# Patient Record
Sex: MALE | Race: WHITE | NOT HISPANIC OR LATINO | Employment: OTHER | URBAN - METROPOLITAN AREA
[De-identification: names, ages, dates, MRNs, and addresses within clinical notes are randomized per-mention and may not be internally consistent; named-entity substitution may affect disease eponyms.]

---

## 2023-05-31 ENCOUNTER — OFFICE VISIT (OUTPATIENT)
Dept: PRIMARY CARE | Facility: CLINIC | Age: 53
End: 2023-05-31
Payer: COMMERCIAL

## 2023-05-31 ENCOUNTER — APPOINTMENT (OUTPATIENT)
Dept: PRIMARY CARE | Facility: CLINIC | Age: 53
End: 2023-05-31
Payer: COMMERCIAL

## 2023-05-31 VITALS
OXYGEN SATURATION: 98 % | SYSTOLIC BLOOD PRESSURE: 110 MMHG | RESPIRATION RATE: 16 BRPM | WEIGHT: 186 LBS | HEART RATE: 87 BPM | BODY MASS INDEX: 24.54 KG/M2 | DIASTOLIC BLOOD PRESSURE: 60 MMHG | TEMPERATURE: 97.4 F

## 2023-05-31 DIAGNOSIS — E78.5 HYPERLIPIDEMIA, UNSPECIFIED HYPERLIPIDEMIA TYPE: ICD-10-CM

## 2023-05-31 DIAGNOSIS — I21.4 NSTEMI (NON-ST ELEVATED MYOCARDIAL INFARCTION) (MULTI): ICD-10-CM

## 2023-05-31 DIAGNOSIS — F43.21 GRIEF: ICD-10-CM

## 2023-05-31 DIAGNOSIS — I25.41 CORONARY ARTERY ANEURYSM: Primary | ICD-10-CM

## 2023-05-31 DIAGNOSIS — Z72.0 TOBACCO ABUSE: ICD-10-CM

## 2023-05-31 PROBLEM — J45.909 ASTHMA (HHS-HCC): Status: ACTIVE | Noted: 2023-05-31

## 2023-05-31 PROCEDURE — 99214 OFFICE O/P EST MOD 30 MIN: CPT | Performed by: FAMILY MEDICINE

## 2023-05-31 RX ORDER — VARENICLINE TARTRATE 0.5 (11)-1
0.5 KIT ORAL 2 TIMES DAILY
Qty: 53 EACH | Refills: 0 | Status: SHIPPED | OUTPATIENT
Start: 2023-05-31 | End: 2023-07-06

## 2023-05-31 RX ORDER — VARENICLINE TARTRATE 1 MG/1
1 TABLET, FILM COATED ORAL 2 TIMES DAILY
Qty: 60 TABLET | Refills: 2 | Status: SHIPPED | OUTPATIENT
Start: 2023-05-31 | End: 2023-07-06 | Stop reason: SDUPTHER

## 2023-05-31 RX ORDER — ATORVASTATIN CALCIUM 40 MG/1
40 TABLET, FILM COATED ORAL DAILY
COMMUNITY
End: 2023-12-04 | Stop reason: SDUPTHER

## 2023-05-31 RX ORDER — CITALOPRAM 20 MG/1
20 TABLET, FILM COATED ORAL DAILY
Qty: 30 TABLET | Refills: 3 | Status: SHIPPED | OUTPATIENT
Start: 2023-05-31 | End: 2023-10-20 | Stop reason: ALTCHOICE

## 2023-05-31 RX ORDER — ASPIRIN 81 MG/1
1 TABLET ORAL DAILY
COMMUNITY
Start: 2020-03-23

## 2023-05-31 RX ORDER — METOPROLOL SUCCINATE 25 MG/1
25 TABLET, EXTENDED RELEASE ORAL DAILY
COMMUNITY
End: 2023-05-31 | Stop reason: SINTOL

## 2023-05-31 RX ORDER — ALBUTEROL SULFATE 90 UG/1
AEROSOL, METERED RESPIRATORY (INHALATION)
COMMUNITY
End: 2023-12-04 | Stop reason: SDUPTHER

## 2023-05-31 NOTE — PROGRESS NOTES
Subjective   Patient ID: Festus Negrete Jr. is a 53 y.o. male who presents for No chief complaint on file..    His dad recently passed away suddenly they think from an MI  He stopped his metoprolol because he doesn't like how it made him feel    He has been smoking and drinking more the past 2 weeks.  HE would like to cut back. Chantix worked for him in the past.     He tried Wellbutrin for depression in the past but he had bad dreams and doesn't want to try that.      He hasn't had his cholesterol checked in quite sometime and is overdue for this.           Review of Systems    Objective   /60   Pulse 87   Temp 36.3 °C (97.4 °F)   Resp 16   Wt 84.4 kg (186 lb)   SpO2 98%   BMI 24.54 kg/m²     Physical Exam  Constitutional:       Appearance: Normal appearance.   HENT:      Head: Normocephalic and atraumatic.   Eyes:      Pupils: Pupils are equal, round, and reactive to light.   Cardiovascular:      Rate and Rhythm: Normal rate and regular rhythm.      Pulses: Normal pulses.      Heart sounds: No murmur heard.  Pulmonary:      Effort: Pulmonary effort is normal.      Breath sounds: Normal breath sounds.   Musculoskeletal:         General: No swelling.      Cervical back: Normal range of motion and neck supple.   Neurological:      Mental Status: He is alert.         Assessment/Plan   Diagnoses and all orders for this visit:  Coronary artery aneurysm  Comments:  Dr. He  NSTEMI (non-ST elevated myocardial infarction) (CMS/[facility])  Hyperlipidemia, unspecified hyperlipidemia type  -     Lipid Panel; Future  -     Comprehensive Metabolic Panel; Future  Tobacco abuse  -     varenicline (Chantix Starting Month Box) 0.5 mg (11)- 1 mg (42) tablet; Take 0.5 mg by mouth 2 times a day.  -     varenicline (Chantix) 1 mg tablet; Take 1 tablet (1 mg) by mouth 2 times a day. Take with full glass of water. Begin after finishing the starter pack  Grief  -     citalopram (CeleXA) 20 mg tablet; Take 1 tablet (20 mg) by mouth  once daily. 1/2 pill week 1 and 2 and then increase to a whole pill

## 2023-05-31 NOTE — PATIENT INSTRUCTIONS
Today we have followed up on several issues    For your smoking I have recommended that you try chantix again.      For your recent loss of your dad/grief and depression I have advised trying celexa this time.  Start with 1/2 tablet daily and then increase to a full tablet daily.     For your beta blocker side effect I will reach out to your cardiologist to see what he recommends instead.      Follow up when results received for your cholesterol     Follow up in 6 to 8 weeks for your celexa recheck.

## 2023-06-27 DIAGNOSIS — F43.21 GRIEF: ICD-10-CM

## 2023-06-27 RX ORDER — CITALOPRAM 20 MG/1
20 TABLET, FILM COATED ORAL DAILY
Qty: 30 TABLET | Refills: 3 | OUTPATIENT
Start: 2023-06-27 | End: 2023-08-26

## 2023-06-29 ENCOUNTER — PATIENT OUTREACH (OUTPATIENT)
Dept: PRIMARY CARE | Facility: CLINIC | Age: 53
End: 2023-06-29
Payer: COMMERCIAL

## 2023-06-29 RX ORDER — METRONIDAZOLE 500 MG/1
500 TABLET ORAL 3 TIMES DAILY
COMMUNITY
End: 2023-07-07 | Stop reason: ENTERED-IN-ERROR

## 2023-06-29 RX ORDER — CIPROFLOXACIN 500 MG/1
500 TABLET ORAL 2 TIMES DAILY
COMMUNITY
End: 2023-12-04 | Stop reason: WASHOUT

## 2023-06-29 RX ORDER — IBUPROFEN 200 MG
1 TABLET ORAL EVERY 24 HOURS
COMMUNITY
End: 2023-07-07

## 2023-06-29 NOTE — PROGRESS NOTES
Discharge Facility: [facility]  Discharge Diagnosis:Diverticulitis of colon with perforation Periumbilical abdominal pain Benign essential hypertension   Admission Date: 6/25/2023  Discharge Date: 6/28/2023    PCP Appointment Date:7/11/2023  Specialist Appointment Date: NONE  Hospital Encounter and Summary:not available at this time   See discharge assessment below for further details    EFRA MURRAY, General Surgery Within 7 to 10 days [address], [address] [place] [address] [id number] Business     Engagement  Call Start Time: 1041 (6/29/2023 10:41 AM)    Medications  Medications reviewed with patient/caregiver?: Yes (6/29/2023 10:41 AM)  Is the patient having any side effects they believe may be caused by any medication additions or changes?: No (6/29/2023 10:41 AM)  Does the patient have all medications ordered at discharge?: Yes (NEW MEDS: CIPRO, FLAGYL, NICOTINE PATCH) (6/29/2023 10:41 AM)  Is the patient taking all medications as directed (includes completed medication regime)?: Yes (6/29/2023 10:41 AM)  Medication Comments: SEE MED LIST (6/29/2023 10:41 AM)    Appointments  Does the patient have a primary care provider?: Yes (6/29/2023 10:41 AM)  Care Management Interventions: Verified appointment date/time/provider (6/29/2023 10:41 AM)  Has the patient kept scheduled appointments due by today?: Yes (6/29/2023 10:41 AM)  Care Management Interventions: Advised patient to keep appointment (6/29/2023 10:41 AM)    Self Management  What is the home health agency?: NONE (6/29/2023 10:41 AM)  Has home health visited the patient within 72 hours of discharge?: Not applicable (6/29/2023 10:41 AM)    Patient Teaching  Does the patient have access to their discharge instructions?: Yes (6/29/2023 10:41 AM)  Care Management Interventions: Reviewed instructions with patient (6/29/2023 10:41 AM)  What is the patient's perception of their health status since discharge?: Improving (6/29/2023 10:41 AM)  Is the  patient/caregiver able to teach back the hierarchy of who to call/visit for symptoms/problems? PCP, Specialist, Home Health nurse, Urgent Care, ED, 911: Yes (6/29/2023 10:41 AM)  If the patient is a current smoker, are they able to teach back resources for cessation?: Smoking cessation medications (6/29/2023 10:41 AM)  Patient/Caregiver Education Comments: NICOTINE PATCH PRESCRIBED AT DISCHARGE (6/29/2023 10:41 AM)    Wrap Up  Wrap Up Additional Comments: spoke with patient. he is doing well. no needs or concerns at this time. has all meds. has appt with pcp scheduled. denies pain (6/29/2023 10:41 AM)  Call End Time: 1044 (6/29/2023 10:41 AM)

## 2023-07-06 ENCOUNTER — TELEPHONE (OUTPATIENT)
Dept: PRIMARY CARE | Facility: CLINIC | Age: 53
End: 2023-07-06
Payer: COMMERCIAL

## 2023-07-06 DIAGNOSIS — Z72.0 TOBACCO ABUSE: Primary | ICD-10-CM

## 2023-07-06 DIAGNOSIS — Z72.0 TOBACCO ABUSE: ICD-10-CM

## 2023-07-06 RX ORDER — VARENICLINE TARTRATE 0.5 (11)-1
0.5 KIT ORAL 2 TIMES DAILY
Qty: 53 EACH | Refills: 0 | OUTPATIENT
Start: 2023-07-06 | End: 2023-10-04

## 2023-07-06 RX ORDER — VARENICLINE TARTRATE 1 MG/1
1 TABLET, FILM COATED ORAL 2 TIMES DAILY
Qty: 60 TABLET | Refills: 2 | Status: SHIPPED | OUTPATIENT
Start: 2023-07-06 | End: 2023-10-20 | Stop reason: ALTCHOICE

## 2023-07-07 ENCOUNTER — OFFICE VISIT (OUTPATIENT)
Dept: PRIMARY CARE | Facility: CLINIC | Age: 53
End: 2023-07-07
Payer: COMMERCIAL

## 2023-07-07 VITALS
OXYGEN SATURATION: 97 % | TEMPERATURE: 98.3 F | WEIGHT: 184 LBS | BODY MASS INDEX: 24.28 KG/M2 | RESPIRATION RATE: 18 BRPM | SYSTOLIC BLOOD PRESSURE: 116 MMHG | HEART RATE: 73 BPM | DIASTOLIC BLOOD PRESSURE: 68 MMHG

## 2023-07-07 DIAGNOSIS — K57.20 DIVERTICULITIS OF COLON WITH PERFORATION: Primary | ICD-10-CM

## 2023-07-07 PROCEDURE — 99495 TRANSJ CARE MGMT MOD F2F 14D: CPT | Performed by: FAMILY MEDICINE

## 2023-07-07 RX ORDER — METRONIDAZOLE 500 MG/1
500 TABLET ORAL 3 TIMES DAILY
COMMUNITY
Start: 2023-06-28 | End: 2023-07-08

## 2023-07-07 NOTE — PATIENT INSTRUCTIONS
Today we have followed up on your recent hospital admission for diverticulitis with perforation    Please follow up with the surgeon Dr. Kareem Deleon    Please follow up with GI I referred you today.      Follow up with me or go to the ER if no improvement or if symptoms worsen

## 2023-07-07 NOTE — PROGRESS NOTES
Subjective   Patient ID: Festus Negrete Jr. is a 53 y.o. male who presents for Hospital Follow-up ([facility] admission for diverticulitis).    2 sundays ago he started having pain in the center and wasn't having regular bm's.  He wasn't seeing any blood but he was just having some constipation. Then he got the fever and pain back and fever 102F.      They said it was perforated but they did labs and IV antibiotics.  He saw a surgeon.  He has to follow up with the surgeon.      He is not having fevers. He is not having a lot of thirst with his antibiotics.  He is still having liquid bowels.  He needs a GI doctor         Review of Systems    Objective   /68   Pulse 73   Temp 36.8 °C (98.3 °F)   Resp 18   Wt 83.5 kg (184 lb)   SpO2 97%   BMI 24.28 kg/m²     Physical Exam  Constitutional:       Appearance: Normal appearance.   HENT:      Head: Normocephalic.      Right Ear: Tympanic membrane normal.      Left Ear: Tympanic membrane normal.      Mouth/Throat:      Mouth: Mucous membranes are moist.   Cardiovascular:      Rate and Rhythm: Normal rate and regular rhythm.   Pulmonary:      Effort: Pulmonary effort is normal. No respiratory distress.   Abdominal:      General: There is no distension.      Palpations: Abdomen is soft. There is no mass.      Tenderness: There is no abdominal tenderness. There is no guarding.      Hernia: No hernia is present.      Comments: Increased bowel sounds   Musculoskeletal:      Cervical back: Neck supple.   Skin:     General: Skin is warm and dry.   Neurological:      General: No focal deficit present.      Mental Status: He is alert.   Psychiatric:         Mood and Affect: Mood normal.         Assessment/Plan   Diagnoses and all orders for this visit:  Diverticulitis of colon with perforation  -     Referral to Gastroenterology; Future

## 2023-07-11 ENCOUNTER — APPOINTMENT (OUTPATIENT)
Dept: PRIMARY CARE | Facility: CLINIC | Age: 53
End: 2023-07-11
Payer: COMMERCIAL

## 2023-07-14 ENCOUNTER — PATIENT OUTREACH (OUTPATIENT)
Dept: PRIMARY CARE | Facility: CLINIC | Age: 53
End: 2023-07-14
Payer: COMMERCIAL

## 2023-07-14 NOTE — PROGRESS NOTES
Call regarding appt. with PCP on 7/7/2023after hospitalization.  At time of outreach call the patient feels as if their condition has IMPROVED since last visit.  Reviewed the PCP appointment with the pt and addressed any questions or concerns.

## 2023-08-16 ENCOUNTER — PATIENT OUTREACH (OUTPATIENT)
Dept: PRIMARY CARE | Facility: CLINIC | Age: 53
End: 2023-08-16
Payer: COMMERCIAL

## 2023-09-10 PROBLEM — J34.2 DEVIATED NASAL SEPTUM: Status: ACTIVE | Noted: 2023-09-10

## 2023-09-10 PROBLEM — I42.2 ASYMMETRIC SEPTAL HYPERTROPHY (MULTI): Status: ACTIVE | Noted: 2023-09-10

## 2023-09-10 PROBLEM — I10 BENIGN ESSENTIAL HYPERTENSION: Status: ACTIVE | Noted: 2023-06-26

## 2023-09-10 PROBLEM — J34.3 HYPERTROPHY OF INFERIOR NASAL TURBINATE: Status: ACTIVE | Noted: 2023-09-10

## 2023-09-10 PROBLEM — I51.7 ASYMMETRIC SEPTAL HYPERTROPHY: Status: ACTIVE | Noted: 2023-09-10

## 2023-09-10 PROBLEM — R07.89 CHEST PAIN, ATYPICAL: Status: ACTIVE | Noted: 2023-09-10

## 2023-09-10 PROBLEM — K57.20 DIVERTICULITIS OF COLON WITH PERFORATION: Status: ACTIVE | Noted: 2023-06-25

## 2023-09-10 PROBLEM — R10.33 PERIUMBILICAL ABDOMINAL PAIN: Status: ACTIVE | Noted: 2023-06-26

## 2023-09-10 PROBLEM — S20.211A CONTUSION OF RIB ON RIGHT SIDE: Status: ACTIVE | Noted: 2023-09-10

## 2023-09-10 PROBLEM — M54.12 CERVICAL RADICULOPATHY, ACUTE: Status: ACTIVE | Noted: 2023-09-10

## 2023-09-10 PROBLEM — J45.40 MODERATE PERSISTENT ASTHMA (HHS-HCC): Status: ACTIVE | Noted: 2023-09-10

## 2023-09-10 PROBLEM — J34.89 NASAL DRYNESS: Status: ACTIVE | Noted: 2023-09-10

## 2023-09-10 PROBLEM — R06.02 SOB (SHORTNESS OF BREATH) ON EXERTION: Status: ACTIVE | Noted: 2023-09-10

## 2023-09-10 PROBLEM — S22.41XA MULTIPLE CLOSED FRACTURES OF RIBS OF RIGHT SIDE: Status: ACTIVE | Noted: 2023-09-10

## 2023-09-10 RX ORDER — FLUTICASONE PROPIONATE 50 MCG
2 SPRAY, SUSPENSION (ML) NASAL DAILY
COMMUNITY
Start: 2023-06-26

## 2023-09-10 RX ORDER — FLUTICASONE PROPIONATE AND SALMETEROL 250; 50 UG/1; UG/1
POWDER RESPIRATORY (INHALATION)
COMMUNITY
Start: 2023-06-26 | End: 2023-12-04 | Stop reason: SDUPTHER

## 2023-09-10 RX ORDER — IBUPROFEN 200 MG
21 TABLET ORAL
COMMUNITY
Start: 2023-06-28 | End: 2023-12-04 | Stop reason: ALTCHOICE

## 2023-09-10 RX ORDER — METOPROLOL SUCCINATE 25 MG/1
25 TABLET, EXTENDED RELEASE ORAL DAILY
COMMUNITY
End: 2023-12-04 | Stop reason: WASHOUT

## 2023-09-28 ENCOUNTER — PATIENT OUTREACH (OUTPATIENT)
Dept: PRIMARY CARE | Facility: CLINIC | Age: 53
End: 2023-09-28
Payer: COMMERCIAL

## 2023-10-13 ENCOUNTER — HOSPITAL ENCOUNTER (OUTPATIENT)
Dept: CARDIOLOGY | Facility: CLINIC | Age: 53
Discharge: HOME | End: 2023-10-13
Payer: COMMERCIAL

## 2023-10-13 ENCOUNTER — APPOINTMENT (OUTPATIENT)
Dept: CARDIOLOGY | Facility: CLINIC | Age: 53
End: 2023-10-13
Payer: COMMERCIAL

## 2023-10-13 DIAGNOSIS — I25.41 CORONARY ARTERY ANEURYSM: Primary | ICD-10-CM

## 2023-10-13 DIAGNOSIS — I25.10 ATHEROSCLEROTIC HEART DISEASE OF NATIVE CORONARY ARTERY WITHOUT ANGINA PECTORIS: ICD-10-CM

## 2023-10-13 DIAGNOSIS — R07.89 OTHER CHEST PAIN: ICD-10-CM

## 2023-10-13 PROCEDURE — 93306 TTE W/DOPPLER COMPLETE: CPT

## 2023-10-13 PROCEDURE — 2500000004 HC RX 250 GENERAL PHARMACY W/ HCPCS (ALT 636 FOR OP/ED): Performed by: INTERNAL MEDICINE

## 2023-10-13 PROCEDURE — 93306 TTE W/DOPPLER COMPLETE: CPT | Performed by: INTERNAL MEDICINE

## 2023-10-13 RX ADMIN — PERFLUTREN 0.5 ML: 6.52 INJECTION, SUSPENSION INTRAVENOUS at 09:13

## 2023-10-16 LAB — EJECTION FRACTION APICAL 4 CHAMBER: 63.1

## 2023-10-20 ENCOUNTER — OFFICE VISIT (OUTPATIENT)
Dept: CARDIOLOGY | Facility: CLINIC | Age: 53
End: 2023-10-20
Payer: COMMERCIAL

## 2023-10-20 VITALS
BODY MASS INDEX: 25.23 KG/M2 | HEART RATE: 66 BPM | WEIGHT: 190.4 LBS | SYSTOLIC BLOOD PRESSURE: 104 MMHG | HEIGHT: 73 IN | DIASTOLIC BLOOD PRESSURE: 64 MMHG | OXYGEN SATURATION: 97 %

## 2023-10-20 DIAGNOSIS — E78.2 MIXED HYPERLIPIDEMIA: ICD-10-CM

## 2023-10-20 DIAGNOSIS — I42.2 HYPERTROPHIC CARDIOMYOPATHY (MULTI): ICD-10-CM

## 2023-10-20 DIAGNOSIS — R06.02 SOB (SHORTNESS OF BREATH) ON EXERTION: ICD-10-CM

## 2023-10-20 DIAGNOSIS — I25.41 CORONARY ARTERY ANEURYSM: Primary | ICD-10-CM

## 2023-10-20 DIAGNOSIS — I34.0 MODERATE MITRAL REGURGITATION: ICD-10-CM

## 2023-10-20 DIAGNOSIS — I20.89 CHRONIC STABLE ANGINA (CMS-HCC): ICD-10-CM

## 2023-10-20 DIAGNOSIS — I10 BENIGN ESSENTIAL HYPERTENSION: ICD-10-CM

## 2023-10-20 PROCEDURE — 3074F SYST BP LT 130 MM HG: CPT | Performed by: INTERNAL MEDICINE

## 2023-10-20 PROCEDURE — 3078F DIAST BP <80 MM HG: CPT | Performed by: INTERNAL MEDICINE

## 2023-10-20 PROCEDURE — 99214 OFFICE O/P EST MOD 30 MIN: CPT | Performed by: INTERNAL MEDICINE

## 2023-10-20 RX ORDER — NITROGLYCERIN 0.4 MG/1
0.4 TABLET SUBLINGUAL EVERY 5 MIN PRN
Qty: 25 TABLET | Refills: 11 | Status: SHIPPED | OUTPATIENT
Start: 2023-10-20 | End: 2024-04-22

## 2023-10-20 NOTE — PATIENT INSTRUCTIONS
We have ordered a cardiac MRI to determine the extent of the muscle buildup in your heart.    Call us a few days after the test for the results.

## 2023-10-20 NOTE — PROGRESS NOTES
Subjective   Festus Negrete Jr. is a 53 y.o. male.    Chief Complaint:  Chest pain    HPI    He describes chest discomfort.  It is in the midsternal area.  It radiates to the right arm and right shoulder.  Usually exertionally related but it also can occur at rest.  Usually last for less than 1 minute.  With activities he will stop the activity and the chest discomfort disappears.  Occasionally he will take sublingual nitroglycerin.  He has a very vigorous job.  No syncopal events.    Cardiac catheterization in March 2020 at [facility] demonstrated a 5-6 mm aneurysm in the mid left anterior descending coronary artery. The remainder of his coronary arteries were normal. Specifically the right coronary artery was normal.    His echocardiographic study demonstrated asymmetric septal hypertrophy. There was no left ventricular outflow tract obstruction although systolic anterior motion of the mitral valve was noted.     Risk factors for coronary artery disease include smoking history and a strongly positive family history of heart disease in that his father had a myocardial infarction at the age of 50. There is no history of hypertension or diabetes. He does have hyperlipidemia.    He's otherwise been in good health with no major medical problems.    He is self-employed as a el contractor.     Social history: Longtime smoker.    Family history: Family history of coronary artery disease in his father and paternal grandfather.    Review of Systems   Cardiovascular:  Positive for chest pain.   All other systems reviewed and are negative.      Objective   Constitutional:       Appearance: Not in distress.   Neck:      Vascular: JVD normal.   Pulmonary:      Breath sounds: Normal breath sounds.   Cardiovascular:      Normal rate. Regular rhythm. S1 with normal intensity. S2 with normal intensity.       Murmurs: There is a grade 1/6 systolic murmur.      No gallop.    Pulses:     Intact distal pulses.   Edema:      Peripheral edema absent.   Abdominal:      General: Bowel sounds are normal.   Neurological:      Mental Status: Alert and oriented to person, place and time.       Assessment/Plan     1.  Hypertrophic cardiomyopathy.  Most of the hypertrophy is in the mid left ventricular septum.  There is no evidence of outflow tract obstruction.  No systolic anterior motion of the mitral valve.  However the degree of left ventricular perjury in this area is very significant.  We are going to do a cardiac MRI to help us risk stratify this patient.  In particular we are looking for fibrosis in this area.  He is to call us with the results of the cardiac MRI.    2.  Coronary artery disease.  Aneurysmal coronary artery disease.  Nonobstructive disease.  Not sure what to make of his symptoms.  These have been stable.  I do not think this is related to his left anterior descending coronary artery aneurysm.  He has no obstructive coronary disease based on a cardiac catheterization 3 years ago.  At some point the future we may do a stress imaging study.    3.  Smoking abuse.  Discussed options for stopping smoking.    4.  History of diverticulitis.  Hospitalized in June 2023 at [facility] for acute diverticulitis.  He had a microperforation.  No cardiac issues during his hospitalization.    5.  Moderate mitral regurgitation.  No symptoms of congestive heart failure.

## 2023-10-25 ENCOUNTER — APPOINTMENT (OUTPATIENT)
Dept: CARDIOLOGY | Facility: CLINIC | Age: 53
End: 2023-10-25
Payer: COMMERCIAL

## 2023-11-08 ENCOUNTER — HOSPITAL ENCOUNTER (OUTPATIENT)
Dept: RADIOLOGY | Facility: HOSPITAL | Age: 53
End: 2023-11-08

## 2023-11-30 ENCOUNTER — HOSPITAL ENCOUNTER (OUTPATIENT)
Dept: RADIOLOGY | Facility: HOSPITAL | Age: 53
Discharge: HOME | End: 2023-11-30
Payer: COMMERCIAL

## 2023-11-30 DIAGNOSIS — I42.2 HYPERTROPHIC CARDIOMYOPATHY (MULTI): ICD-10-CM

## 2023-11-30 PROCEDURE — 75565 CARD MRI VELOC FLOW MAPPING: CPT | Performed by: STUDENT IN AN ORGANIZED HEALTH CARE EDUCATION/TRAINING PROGRAM

## 2023-11-30 PROCEDURE — 75561 CARDIAC MRI FOR MORPH W/DYE: CPT

## 2023-11-30 PROCEDURE — 75561 CARDIAC MRI FOR MORPH W/DYE: CPT | Performed by: STUDENT IN AN ORGANIZED HEALTH CARE EDUCATION/TRAINING PROGRAM

## 2023-11-30 PROCEDURE — 2550000001 HC RX 255 CONTRASTS: Performed by: INTERNAL MEDICINE

## 2023-11-30 PROCEDURE — A9575 INJ GADOTERATE MEGLUMI 0.1ML: HCPCS | Performed by: INTERNAL MEDICINE

## 2023-11-30 RX ORDER — GADOTERATE MEGLUMINE 376.9 MG/ML
25 INJECTION INTRAVENOUS
Status: COMPLETED | OUTPATIENT
Start: 2023-11-30 | End: 2023-11-30

## 2023-11-30 RX ADMIN — GADOTERATE MEGLUMINE 25 ML: 376.9 INJECTION INTRAVENOUS at 08:21

## 2023-12-04 ENCOUNTER — OFFICE VISIT (OUTPATIENT)
Dept: PRIMARY CARE | Facility: CLINIC | Age: 53
End: 2023-12-04
Payer: COMMERCIAL

## 2023-12-04 ENCOUNTER — LAB (OUTPATIENT)
Dept: LAB | Facility: LAB | Age: 53
End: 2023-12-04
Payer: COMMERCIAL

## 2023-12-04 VITALS
BODY MASS INDEX: 25.46 KG/M2 | OXYGEN SATURATION: 95 % | DIASTOLIC BLOOD PRESSURE: 86 MMHG | SYSTOLIC BLOOD PRESSURE: 128 MMHG | WEIGHT: 193 LBS | HEART RATE: 76 BPM | RESPIRATION RATE: 18 BRPM | TEMPERATURE: 98 F

## 2023-12-04 DIAGNOSIS — I25.41 CORONARY ARTERY ANEURYSM: ICD-10-CM

## 2023-12-04 DIAGNOSIS — J45.40 MODERATE PERSISTENT ASTHMA, UNSPECIFIED WHETHER COMPLICATED (HHS-HCC): Primary | ICD-10-CM

## 2023-12-04 DIAGNOSIS — I42.2 HYPERTROPHIC CARDIOMYOPATHY (MULTI): ICD-10-CM

## 2023-12-04 DIAGNOSIS — Z72.0 TOBACCO ABUSE: ICD-10-CM

## 2023-12-04 DIAGNOSIS — E78.5 HYPERLIPIDEMIA, UNSPECIFIED HYPERLIPIDEMIA TYPE: ICD-10-CM

## 2023-12-04 PROBLEM — J45.909 ASTHMA (HHS-HCC): Status: RESOLVED | Noted: 2023-05-31 | Resolved: 2023-12-04

## 2023-12-04 PROBLEM — K63.5 SERRATED POLYP OF COLON: Status: ACTIVE | Noted: 2023-10-26

## 2023-12-04 PROBLEM — D49.0 NEOPLASM OF APPENDIX: Status: ACTIVE | Noted: 2023-11-30

## 2023-12-04 PROBLEM — S22.41XA MULTIPLE CLOSED FRACTURES OF RIBS OF RIGHT SIDE: Status: RESOLVED | Noted: 2023-09-10 | Resolved: 2023-12-04

## 2023-12-04 PROBLEM — K57.92 DIVERTICULITIS: Status: ACTIVE | Noted: 2023-10-26

## 2023-12-04 LAB
ALBUMIN SERPL BCP-MCNC: 4.4 G/DL (ref 3.4–5)
ALP SERPL-CCNC: 76 U/L (ref 33–120)
ALT SERPL W P-5'-P-CCNC: 35 U/L (ref 10–52)
ANION GAP SERPL CALC-SCNC: 11 MMOL/L (ref 10–20)
AST SERPL W P-5'-P-CCNC: 29 U/L (ref 9–39)
BILIRUB SERPL-MCNC: 0.8 MG/DL (ref 0–1.2)
BUN SERPL-MCNC: 19 MG/DL (ref 6–23)
CALCIUM SERPL-MCNC: 9.4 MG/DL (ref 8.6–10.3)
CHLORIDE SERPL-SCNC: 104 MMOL/L (ref 98–107)
CHOLEST SERPL-MCNC: 149 MG/DL (ref 0–199)
CHOLESTEROL/HDL RATIO: 2.4
CO2 SERPL-SCNC: 26 MMOL/L (ref 21–32)
CREAT SERPL-MCNC: 0.79 MG/DL (ref 0.5–1.3)
GFR SERPL CREATININE-BSD FRML MDRD: >90 ML/MIN/1.73M*2
GLUCOSE SERPL-MCNC: 80 MG/DL (ref 74–99)
HDLC SERPL-MCNC: 61.7 MG/DL
LDLC SERPL CALC-MCNC: 68 MG/DL
NON HDL CHOLESTEROL: 87 MG/DL (ref 0–149)
POTASSIUM SERPL-SCNC: 4.4 MMOL/L (ref 3.5–5.3)
PROT SERPL-MCNC: 6.4 G/DL (ref 6.4–8.2)
SODIUM SERPL-SCNC: 137 MMOL/L (ref 136–145)
TRIGL SERPL-MCNC: 96 MG/DL (ref 0–149)
VLDL: 19 MG/DL (ref 0–40)

## 2023-12-04 PROCEDURE — 80061 LIPID PANEL: CPT

## 2023-12-04 PROCEDURE — 3079F DIAST BP 80-89 MM HG: CPT | Performed by: FAMILY MEDICINE

## 2023-12-04 PROCEDURE — 3074F SYST BP LT 130 MM HG: CPT | Performed by: FAMILY MEDICINE

## 2023-12-04 PROCEDURE — 80053 COMPREHEN METABOLIC PANEL: CPT

## 2023-12-04 PROCEDURE — 99214 OFFICE O/P EST MOD 30 MIN: CPT | Performed by: FAMILY MEDICINE

## 2023-12-04 PROCEDURE — 36415 COLL VENOUS BLD VENIPUNCTURE: CPT

## 2023-12-04 RX ORDER — ALBUTEROL SULFATE 90 UG/1
2 AEROSOL, METERED RESPIRATORY (INHALATION) EVERY 4 HOURS PRN
Qty: 18 G | Refills: 3 | Status: SHIPPED | OUTPATIENT
Start: 2023-12-04

## 2023-12-04 RX ORDER — IBUPROFEN 200 MG
1 TABLET ORAL EVERY 24 HOURS
Qty: 90 PATCH | Refills: 1 | Status: SHIPPED | OUTPATIENT
Start: 2023-12-04 | End: 2023-12-06 | Stop reason: ALTCHOICE

## 2023-12-04 RX ORDER — FLUTICASONE PROPIONATE AND SALMETEROL 250; 50 UG/1; UG/1
1 POWDER RESPIRATORY (INHALATION)
Qty: 3 EACH | Refills: 1 | Status: SHIPPED | OUTPATIENT
Start: 2023-12-04 | End: 2023-12-06

## 2023-12-04 RX ORDER — ATORVASTATIN CALCIUM 40 MG/1
40 TABLET, FILM COATED ORAL NIGHTLY
Qty: 90 TABLET | Refills: 1 | Status: SHIPPED | OUTPATIENT
Start: 2023-12-04 | End: 2024-05-28

## 2023-12-04 ASSESSMENT — ENCOUNTER SYMPTOMS: HYPERTENSION: 1

## 2023-12-04 NOTE — PATIENT INSTRUCTIONS
Today we addressed your high blood pressure. You stopped your medication and your cardiologist is aware.  You will be having surgery end of this month.  Please talk to cardiology regarding pre-surgical risk assessment.     For your asthma you have been out of your inhaler so I have refilled this for you.    I  strongly encourage you to quit smoking.   I have prescribed nicotine patches for you again to try to quit smoking    You had recent imaging of your heart to follow up on your HOCM and aneurysm and I have advised going over this with your cardiologist who ordered it.      Please have you labs done today for your cholesterol I ordered these in May but do not see any results.  I have refilled your statin and advise for you to be taking this at bedtime.      Please make an appointment to follow up for your Annual Physical. You deferred a flu shot today.  Please consider this and the covid booster at your local pharmacy.

## 2023-12-04 NOTE — PROGRESS NOTES
Subjective   Patient ID: Festus Negrete Jr. is a 53 y.o. male who presents for Hypertension and Hyperlipidemia.    He has to get his appendix out  because of an abnormality on the colonoscopy.  That will be at [facility]. It's on 12/29  He had an MRI of the heart - Dr. He - just last week - hasn't had the results back yet.  Knows he has some thickening of the heart and aneurysm in the widomaker    Still smoking - was on nicotine patch - smoking 1.5 ppd  His wife  wants him to start the nicotine patch again.    He defers a flu shot  States he isn't taking anything for his bp anymore.  He was on toprol XL.  He told Dr. He    Hypertension  Patient is here for follow-up of elevated blood pressure. He is not exercising and is not adherent to a low-salt diet. Blood pressure unknown well controlled at home. Cardiac symptoms: none. Patient denies none. Cardiovascular risk factors: dyslipidemia, hypertension, and male gender. Use of agents associated with hypertension: none. History of target organ damage: none.      Hypertension    Hyperlipidemia         Review of Systems    Objective   /86   Pulse 76   Temp 36.7 °C (98 °F)   Resp 18   Wt 87.5 kg (193 lb)   SpO2 95%   BMI 25.46 kg/m²     Physical Exam  Constitutional:       Appearance: Normal appearance.   HENT:      Head: Normocephalic and atraumatic.   Eyes:      Pupils: Pupils are equal, round, and reactive to light.   Cardiovascular:      Rate and Rhythm: Normal rate and regular rhythm.      Pulses: Normal pulses.      Heart sounds: No murmur heard.  Pulmonary:      Effort: Pulmonary effort is normal.      Breath sounds: Normal breath sounds.   Musculoskeletal:         General: No swelling.      Cervical back: Normal range of motion and neck supple.   Neurological:      Mental Status: He is alert.         Assessment/Plan   Diagnoses and all orders for this visit:  Moderate persistent asthma, unspecified whether complicated  -     fluticasone  propion-salmeteroL (Advair Diskus) 250-50 mcg/dose diskus inhaler; Inhale 1 puff 2 times a day. Rinse mouth with water after use to reduce aftertaste and incidence of candidiasis. Do not swallow.  -     albuterol 90 mcg/actuation inhaler; Inhale 2 puffs every 4 hours if needed for wheezing or shortness of breath.  Hyperlipidemia, unspecified hyperlipidemia type  -     Comprehensive Metabolic Panel; Future  -     Lipid Panel; Future  -     atorvastatin (Lipitor) 40 mg tablet; Take 1 tablet (40 mg) by mouth once daily at bedtime.  Tobacco abuse  -     nicotine (Nicoderm CQ) 21 mg/24 hr patch; Place 1 patch over 24 hours on the skin once every 24 hours.

## 2023-12-06 ENCOUNTER — OFFICE VISIT (OUTPATIENT)
Dept: CARDIOLOGY | Facility: CLINIC | Age: 53
End: 2023-12-06
Payer: COMMERCIAL

## 2023-12-06 VITALS
HEIGHT: 73 IN | SYSTOLIC BLOOD PRESSURE: 118 MMHG | WEIGHT: 192.6 LBS | HEART RATE: 67 BPM | DIASTOLIC BLOOD PRESSURE: 66 MMHG | BODY MASS INDEX: 25.53 KG/M2 | OXYGEN SATURATION: 98 %

## 2023-12-06 DIAGNOSIS — E78.2 MIXED HYPERLIPIDEMIA: ICD-10-CM

## 2023-12-06 DIAGNOSIS — R06.02 SOB (SHORTNESS OF BREATH) ON EXERTION: ICD-10-CM

## 2023-12-06 DIAGNOSIS — I42.2 HYPERTROPHIC CARDIOMYOPATHY (MULTI): Primary | ICD-10-CM

## 2023-12-06 DIAGNOSIS — I25.41 CORONARY ARTERY ANEURYSM: ICD-10-CM

## 2023-12-06 DIAGNOSIS — I10 BENIGN ESSENTIAL HYPERTENSION: ICD-10-CM

## 2023-12-06 DIAGNOSIS — I34.0 MODERATE MITRAL REGURGITATION: ICD-10-CM

## 2023-12-06 PROCEDURE — 3074F SYST BP LT 130 MM HG: CPT | Performed by: INTERNAL MEDICINE

## 2023-12-06 PROCEDURE — 99214 OFFICE O/P EST MOD 30 MIN: CPT | Performed by: INTERNAL MEDICINE

## 2023-12-06 PROCEDURE — 3078F DIAST BP <80 MM HG: CPT | Performed by: INTERNAL MEDICINE

## 2023-12-06 RX ORDER — FLUTICASONE FUROATE, UMECLIDINIUM BROMIDE AND VILANTEROL TRIFENATATE 200; 62.5; 25 UG/1; UG/1; UG/1
1 POWDER RESPIRATORY (INHALATION) DAILY
Qty: 30 EACH | Refills: 5 | Status: SHIPPED | OUTPATIENT
Start: 2023-12-06 | End: 2023-12-06 | Stop reason: WASHOUT

## 2023-12-06 RX ORDER — METOPROLOL SUCCINATE 25 MG/1
25 TABLET, EXTENDED RELEASE ORAL DAILY
Qty: 90 TABLET | Refills: 3 | Status: SHIPPED | OUTPATIENT
Start: 2023-12-06 | End: 2024-01-30 | Stop reason: SINTOL

## 2023-12-06 NOTE — PROGRESS NOTES
Subjective   Festus Negrete Jr. is a 53 y.o. male.    Chief Complaint:    HPI:    On his this visit he has noted to have asymmetric septal hypertrophy.  There was no left ventricular outflow tract obstruction.  We elected to proceed with a cardiac MRI.  He is here for follow-up of the results.  Overall he feels well.  He does require surgery by Dr. Deleon.    Cardiac catheterization in March 2020 at [facility] demonstrated a 5-6 mm aneurysm in the mid left anterior descending coronary artery. The remainder of his coronary arteries were normal. Specifically the right coronary artery was normal.     His echocardiographic study demonstrated asymmetric septal hypertrophy. There was no left ventricular outflow tract obstruction although systolic anterior motion of the mitral valve was noted.      Risk factors for coronary artery disease include smoking history and a strongly positive family history of heart disease in that his father had a myocardial infarction at the age of 50. There is no history of hypertension or diabetes. He does have hyperlipidemia.     He's otherwise been in good health with no major medical problems.     He is self-employed as a el contractor.      Social history: Longtime smoker.     Family history: Family history of coronary artery disease in his father and paternal grandfather.    Allergies to medications: Morphine.    Review of Systems   All other systems reviewed and are negative.      Visit Vitals  Smoking Status Every Day        Objective     Constitutional:       Appearance: Not in distress.   Neck:      Vascular: JVD normal.   Pulmonary:      Breath sounds: Normal breath sounds.   Cardiovascular:      Normal rate. Regular rhythm. S1 with normal intensity. S2 with normal intensity.       Murmurs: There is a grade 1/6 systolic murmur.      No gallop.    Pulses:     Intact distal pulses.   Edema:     Peripheral edema absent.   Abdominal:      General: Bowel sounds are normal.    Neurological:      Mental Status: Alert and oriented to person, place and time.         Lab Review:   Lab Results   Component Value Date     12/04/2023    K 4.4 12/04/2023     12/04/2023    CO2 26 12/04/2023    BUN 19 12/04/2023    CREATININE 0.79 12/04/2023    GLUCOSE 80 12/04/2023    CALCIUM 9.4 12/04/2023       Assessment:    1.  Hypertrophic cardiomyopathy with no outflow obstruction.  The hypertrophy is in the inferior wall.  This is a very unusual location.  However there is a area of marked hypertrophy.  There may be mild mid outflow obstruction but this was not demonstrated on the cardiac MRI.  Will add low-dose beta-blocker therapy.  Will also discussed our findings with Dr. Boone who read the cardiac MRI.  The overall degree of fibrosis is 8%.  This is probably reasonable given the level of hypertrophy noted on the cardiac MRI.  There are some new options for therapy including  Camzos.    2.  Coronary artery disease.  Aneurysmal coronary artery disease.  Nonobstructive disease.    3.  Smoking abuse.  Discussed the need to stop smoking.    4.  Mitral regurgitation.  May be related to his other cardiac issues.  He is asymptomatic with respect to this problem and this does not require any specific therapy at this time.    We discussed all our findings and reviewed the CT findings with the patient.

## 2023-12-15 ENCOUNTER — TELEPHONE (OUTPATIENT)
Dept: PRIMARY CARE | Facility: CLINIC | Age: 53
End: 2023-12-15
Payer: COMMERCIAL

## 2023-12-15 NOTE — TELEPHONE ENCOUNTER
Per pt, [facility] is out of Advair 250/50. He is asking if he can have something similar called in instead.

## 2023-12-18 DIAGNOSIS — J45.40 MODERATE PERSISTENT ASTHMA, UNSPECIFIED WHETHER COMPLICATED (HHS-HCC): Primary | ICD-10-CM

## 2023-12-18 RX ORDER — FLUTICASONE FUROATE, UMECLIDINIUM BROMIDE AND VILANTEROL TRIFENATATE 200; 62.5; 25 UG/1; UG/1; UG/1
1 POWDER RESPIRATORY (INHALATION) DAILY
Qty: 30 EACH | Refills: 11 | Status: SHIPPED | OUTPATIENT
Start: 2023-12-18 | End: 2024-12-17

## 2024-01-30 ENCOUNTER — OFFICE VISIT (OUTPATIENT)
Dept: CARDIOLOGY | Facility: CLINIC | Age: 54
End: 2024-01-30
Payer: COMMERCIAL

## 2024-01-30 VITALS
OXYGEN SATURATION: 96 % | WEIGHT: 196 LBS | SYSTOLIC BLOOD PRESSURE: 115 MMHG | HEART RATE: 62 BPM | BODY MASS INDEX: 25.86 KG/M2 | DIASTOLIC BLOOD PRESSURE: 79 MMHG

## 2024-01-30 DIAGNOSIS — I25.84 CORONARY ARTERY CALCIFICATION: ICD-10-CM

## 2024-01-30 DIAGNOSIS — R07.9 CHEST PAIN, UNSPECIFIED TYPE: Primary | ICD-10-CM

## 2024-01-30 DIAGNOSIS — I25.10 CORONARY ARTERY CALCIFICATION: ICD-10-CM

## 2024-01-30 DIAGNOSIS — I42.2 HYPERTROPHIC CARDIOMYOPATHY (MULTI): ICD-10-CM

## 2024-01-30 DIAGNOSIS — R06.09 DOE (DYSPNEA ON EXERTION): ICD-10-CM

## 2024-01-30 DIAGNOSIS — I25.41 ANEURYSM OF CORONARY VESSELS: ICD-10-CM

## 2024-01-30 PROCEDURE — 3074F SYST BP LT 130 MM HG: CPT | Performed by: INTERNAL MEDICINE

## 2024-01-30 PROCEDURE — 99214 OFFICE O/P EST MOD 30 MIN: CPT | Performed by: INTERNAL MEDICINE

## 2024-01-30 PROCEDURE — 3078F DIAST BP <80 MM HG: CPT | Performed by: INTERNAL MEDICINE

## 2024-01-30 PROCEDURE — 93005 ELECTROCARDIOGRAM TRACING: CPT | Performed by: INTERNAL MEDICINE

## 2024-01-30 PROCEDURE — 93010 ELECTROCARDIOGRAM REPORT: CPT | Performed by: INTERNAL MEDICINE

## 2024-01-30 PROCEDURE — 99204 OFFICE O/P NEW MOD 45 MIN: CPT | Performed by: INTERNAL MEDICINE

## 2024-01-30 RX ORDER — VERAPAMIL HYDROCHLORIDE 120 MG/1
120 TABLET, FILM COATED, EXTENDED RELEASE ORAL NIGHTLY
Qty: 90 TABLET | Refills: 3 | Status: SHIPPED | OUTPATIENT
Start: 2024-01-30 | End: 2025-01-29

## 2024-01-30 ASSESSMENT — PAIN SCALES - GENERAL: PAINLEVEL: 0-NO PAIN

## 2024-01-30 ASSESSMENT — ENCOUNTER SYMPTOMS
DIARRHEA: 0
MEMORY LOSS: 0
VOMITING: 0
ABDOMINAL PAIN: 0
ALTERED MENTAL STATUS: 0
FEVER: 0
OCCASIONAL FEELINGS OF UNSTEADINESS: 0
COUGH: 0
MYALGIAS: 0
HEMATURIA: 0
LOSS OF SENSATION IN FEET: 0
FALLS: 0
NAUSEA: 0
CHILLS: 0
CONSTIPATION: 0
WHEEZING: 0
DYSURIA: 0
HEADACHES: 0
BLOATING: 0
DEPRESSION: 0
HEMOPTYSIS: 0

## 2024-01-30 NOTE — PROGRESS NOTES
Chief complaint:  FU     HPI  52 yo WM w/ h/o HCM (no obst), MR, cor aneurysm (LAD), CAC, asthma, TOB now here for cardiology consult. +occ R chest tight at rest x 10-15 minutes, no radiation, no assoc symptoms (less on BB). No chest pain. No dyspnea at rest. +RUBY (mild-mod exertion). No orthopnea/PND. No palps. No LH/dizzy/syncope. No edema. No claudication. +occ cough. +fatigue on BB. +snoring.  ECG 4/21: SR (61), ?LAE, IL ST-T changes  ECG 10/22: SB (59), ?LAE, IL ST-T changes  ECG 1/24: SR (61), ?LAE, IL ST-T changes  Echo 3/20: EF 70-75%, IVS 3.4cm, PW 0.3cm, mod LAE  Echo 10/23: EF 60%, DD, mod LAE, sev NADIR - prox-min septum (IVS 1.75, PW 1.2), no LVOT obst, mod MR  cMRI 12/23: EF 57%, HCM (mid-apical septum 2.6cm), no LVOT obst, apex slight aneurysm, mild MR  Cath 3/20: LAD aneurysm, no CAD, LVEDP 9  CT chest 1/23: mod CAC, mild CM, no peric eff, min AA, no aneurysm    Review of Systems   Constitutional: Negative for chills, fever and malaise/fatigue.   HENT:  Negative for hearing loss.    Eyes:  Negative for visual disturbance.   Respiratory:  Negative for cough, hemoptysis and wheezing.    Skin:  Negative for rash.   Musculoskeletal:  Negative for falls and myalgias.   Gastrointestinal:  Negative for bloating, abdominal pain, constipation, diarrhea, dysphagia, nausea and vomiting.   Genitourinary:  Negative for dysuria and hematuria.   Neurological:  Negative for headaches.   Psychiatric/Behavioral:  Negative for altered mental status, depression and memory loss.         Social History     Tobacco Use    Smoking status: Every Day     Packs/day: 2.00     Years: 20.00     Additional pack years: 0.00     Total pack years: 40.00     Types: Cigarettes    Smokeless tobacco: Never    Tobacco comments:     Sometimes decreases and sometimes increases his smoking   Substance Use Topics    Alcohol use: Yes     Comment: More in the last 2 weeks but drinks normally about 6 to 10 beers 4 times a week        Family  History   Problem Relation Name Age of Onset    Breast cancer Mother      Bone cancer Mother      Heart attack Father      Coronary artery disease Father      Other (healthy adult) Father      Schizophrenia Sister      Bipolar disorder Sister          Allergies   Allergen Reactions    Tree Nuts Shortness of breath    Morphine Other and Hives        Current Outpatient Medications   Medication Instructions    albuterol 90 mcg/actuation inhaler 2 puffs, inhalation, Every 4 hours PRN    aspirin 81 mg EC tablet 1 tablet, oral, Daily    atorvastatin (LIPITOR) 40 mg, oral, Nightly    fluticasone (Flonase Allergy Relief) 50 mcg/actuation nasal spray 2 sprays, Each Nostril, Daily    fluticasone-umeclidin-vilanter (Trelegy Ellipta) 200-62.5-25 mcg blister with device 1 puff, inhalation, Daily    nitroglycerin (NITROSTAT) 0.4 mg, sublingual, Every 5 min PRN, May repeat dose every 5 minutes for up to 3 doses total.    verapamil SR (CALAN-SR) 120 mg, oral, Nightly, Do not crush or chew.        Vitals:    01/30/24 1018   BP: 115/79   Pulse:    SpO2:         Physical Exam  Constitutional:       Appearance: Normal appearance.   HENT:      Head: Normocephalic and atraumatic.      Nose: Nose normal.   Neck:      Vascular: No carotid bruit.   Cardiovascular:      Rate and Rhythm: Normal rate and regular rhythm.      Heart sounds: No murmur heard.  Pulmonary:      Effort: Pulmonary effort is normal.      Breath sounds: Normal breath sounds.   Abdominal:      Palpations: Abdomen is soft.      Tenderness: There is no abdominal tenderness.   Musculoskeletal:      Right lower leg: No edema.      Left lower leg: No edema.   Skin:     General: Skin is warm and dry.   Neurological:      General: No focal deficit present.      Mental Status: He is alert.   Psychiatric:         Mood and Affect: Mood normal.         Judgment: Judgment normal.        Lab Results   Component Value Date    CR 0.79  12/23    HGB      K 4.4  12/23    MG      BNP       PLT      LDL      TSH 1.12 11/22     Assessment/Plan   54 yo WM w/ h/o HCM (no obst), MR, cor aneurysm (LAD), CAC, asthma, TOB now w/ occ RUBY/chest tight of unclear etiology. Check SEAN (also to eval gradients with exercise; no LVOT obst at rest). Cath 3/20 with no CAD, +LAD aneurysm. Refer to HCM center.  -continue ASA 81 every day   -change Metoprolol Succinate 25 every day (causing him fatigue; although reducing CP/RUBY) to Verapamil 120 every day  -continue Atorvastatin 40 every day   -encourage smoking cessation (at minimum cutting down to start)  -FU PRN or 1 year (if doesn't follow with HCM center)    Xavier Layton MD

## 2024-01-31 ENCOUNTER — TELEPHONE (OUTPATIENT)
Dept: CARDIOLOGY | Facility: HOSPITAL | Age: 54
End: 2024-01-31
Payer: COMMERCIAL

## 2024-02-01 LAB
ATRIAL RATE: 61 BPM
P AXIS: 63 DEGREES
P OFFSET: 203 MS
P ONSET: 143 MS
PR INTERVAL: 148 MS
Q ONSET: 217 MS
QRS COUNT: 10 BEATS
QRS DURATION: 86 MS
QT INTERVAL: 426 MS
QTC CALCULATION(BAZETT): 428 MS
QTC FREDERICIA: 428 MS
R AXIS: 67 DEGREES
T AXIS: -60 DEGREES
T OFFSET: 430 MS
VENTRICULAR RATE: 61 BPM

## 2024-02-06 NOTE — PROGRESS NOTES
[facility] [place]   Hypertrophic Cardiomyopathy Center    Primary Care Physician: Madelyn Ruff DO  Primary Cardiologist:  Dr. Xavier Layton/Dr. Del He  Date of Visit: 2024  3:20 PM EST     HPI / Summary:   Festus Negrete Jr. is a 53 y.o. male with non obstructive HCM and an LAD aneurysm who presents for consultation regarding HCM.     He initially presented for cardiac care in 2020 with a picture consistent with acute coronary syndrome.  He had a coronary angiogram which showed a 5 to 6 mm aneurysm in the mid LAD but otherwise his coronary arteries were normal.  He was on DAPT for 1 month and then continued on aspirin 81 mg daily.  Echocardiogram at the time showed asymmetric septal hypertrophy with no LV outflow tract obstruction.    As of 2024, he has mild dyspnea on exertion with carrying heavy things. He also occasionally has tightness on his right side that goes down his arm. No orthopnea or PND. He recently saw Dr. Xavier Layton (2024) who changed his metoprolol 25 mg XL to verapamil 120 mg due to fatigue. He has been feeling better off metoprolol. Stress echo on 2024 showed no LYNSEY or LVOT obstruction at rest or with exertion.       HCM History  The patient was initially 2020 with LVH. He reports only being told that he has HCM in .   Family History of HCM: None  NYHA Class: II  Wall thickness: 2.6 cm on cardiac MRI 2023  EF: ~60%,   LVOT obstruction: none  ICD or PPM: none  Prior septal reduction therapy: none  Genetic Testing: pending  Parents: Father  at age 75 suddenly. Mother - breast cancer  Children: 2 kids - Celeste (b ), Flavia (b ) -   Siblings: Sister Apolonia - schizophrenia, Sister Aziza - healthy, Brother Steven - healthy  Grandmother  in her 60s  Fathers cousin  in his 20s   - wife Danielle   Father's aunts and uncles  suddenly    Sudden Cardiac Arrest Risk Factors:   No  syncope  No wall thickness above 30 mm.   No apical aneurysm after discussion with our CMR expert (thin, but contractile)  Ambulatory monitoring pending  No EF less than 50%  LGE >15% per discussion with CMR expert  No family history of SCA in a family member with HCM  SCA risk: pending Holter, likely at least moderate    ROS: Relevant review of symptoms of negative unless discussed above.     Problems:   Patient Active Problem List   Diagnosis    NSTEMI (non-ST elevated myocardial infarction) (CMS/HCC)    Coronary artery aneurysm    Tobacco abuse    Hyperlipidemia    Hypertrophic cardiomyopathy (CMS/HCC)    Benign essential hypertension    Cervical radiculopathy, acute    Chest pain    Contusion of rib on right side    Deviated nasal septum    Diverticulitis of colon with perforation    Moderate persistent asthma    Nasal dryness    Hypertrophy of inferior nasal turbinate    Periumbilical abdominal pain    RUBY (dyspnea on exertion)    Chronic stable angina    Moderate mitral regurgitation    Diverticulitis    Neoplasm of appendix    Serrated polyp of colon    Aneurysm of coronary vessels    Coronary artery calcification       Medical History:   Past Medical History:   Diagnosis Date    Chest pain, atypical 09/10/2023    Chronic stable angina 10/20/2023    Contusion of rib on right side 09/10/2023    Coronary artery aneurysm 05/31/2023    Dr. He    Deviated nasal septum 09/10/2023    Diverticulitis of colon with perforation 06/25/2023    Hyperlipidemia 05/31/2023    Hypertrophic cardiomyopathy (CMS/HCC) 09/10/2023    Hypertrophy of inferior nasal turbinate 09/10/2023    Moderate persistent asthma, uncomplicated 02/25/2020    Moderate persistent asthma    NSTEMI (non-ST elevated myocardial infarction) (CMS/HCC) 05/31/2023    Tobacco abuse 05/31/2023       Surgical Hx:   Past Surgical History:   Procedure Laterality Date    OTHER SURGICAL HISTORY  03/25/2020    Cardiac catheterization    OTHER SURGICAL HISTORY   "03/25/2020    Foot surgery        Family Hx:   Family History   Problem Relation Name Age of Onset    Breast cancer Mother      Bone cancer Mother      Heart attack Father      Coronary artery disease Father      Other (healthy adult) Father      Schizophrenia Sister      Bipolar disorder Sister          Social Hx:  Fun: camper, baldemar trujillo  Occupation/School: Self employed el contractor.   EtOH/Smoking/Drugs: 1.5 packs per day for 40 years, 5-6 beers at a time a few days per week.     Medications  Current Outpatient Medications   Medication Instructions    albuterol 90 mcg/actuation inhaler 2 puffs, inhalation, Every 4 hours PRN    aspirin 81 mg EC tablet 1 tablet, oral, Daily    atorvastatin (LIPITOR) 40 mg, oral, Nightly    fluticasone (Flonase Allergy Relief) 50 mcg/actuation nasal spray 2 sprays, Each Nostril, Daily    fluticasone-umeclidin-vilanter (Trelegy Ellipta) 200-62.5-25 mcg blister with device 1 puff, inhalation, Daily    nitroglycerin (NITROSTAT) 0.4 mg, sublingual, Every 5 min PRN, May repeat dose every 5 minutes for up to 3 doses total.    verapamil SR (CALAN-SR) 120 mg, oral, Nightly, Do not crush or chew.       Allergies  Tree nuts and Morphine    Exam:   Vitals: /72 (BP Location: Right arm, Patient Position: Sitting, BP Cuff Size: Large adult)   Pulse 78   Ht 1.854 m (6' 1\")   Wt 87.6 kg (193 lb 1 oz)   SpO2 93%   BMI 25.47 kg/m²   Wt Readings from Last 5 Encounters:   02/08/24 87.6 kg (193 lb 1 oz)   01/30/24 88.9 kg (196 lb)   12/06/23 87.4 kg (192 lb 9.6 oz)   12/04/23 87.5 kg (193 lb)   10/20/23 86.4 kg (190 lb 6.4 oz)     GEN: Pleasant, well-appearing, no acute distress.  HEENT: JVP not elevated  CHEST: Clear to auscultation, No wheeze, good air movement.  CV: normal rate, regular rhythm, no murmurs at rest or with valsalva.   ABD: Soft  EXT: Warm, well perfused, No LE edema.   NEURO: grossly non focal  SKIN: No obvious rashes     Labs:   Lipids  Lab Results   Component " "Value Date    CHOL 149 12/04/2023     Lab Results   Component Value Date    HDL 61.7 12/04/2023     Lab Results   Component Value Date    LDLCALC 68 12/04/2023     Lab Results   Component Value Date    TRIG 96 12/04/2023     No components found for: \"CHOLHDL\"    Hemoglobin A1C  No results found for: \"HGBA1C\"    BMP  Lab Results   Component Value Date    GLUCOSE 80 12/04/2023    CALCIUM 9.4 12/04/2023     12/04/2023    K 4.4 12/04/2023    CO2 26 12/04/2023     12/04/2023    BUN 19 12/04/2023    CREATININE 0.79 12/04/2023         Notable Studies: imaging personally reviewed and summarized by me below  EKG:  -2/8/2024: NSR, resting HR 65 bpm, TWI in II, III, avF and V4-6    Echo:  -10/13/2023: IVS 2.6 cm, no LYNSEY or LVOT obstruction    Ambulatory Rhythm Monitor:   -pending    Cardiac MRI:  -11/2023: Maximal thickness 2.6 cm at the mid inferoseptal/anteroseptal walls.  [place] is slightly aneurysmal, no LYNSEY or LV outflow tract obstruction.  LVEF 57%. There is subepicardial mid wall late gadolinium enhancement at the level of the basal anteroseptal and anterior wall as well as the apical inferior wall. Estimated scar burden of 8%.  Mild mitral regurgitation    Stress Test:  -2/8/2024: Stage III post protocol, 9 minutes, 10 METS, peak heart rate 129 bpm, 77% of age-predicted heart rate, stopped due to fatigue and dyspnea, ECG showed exaggeration of baseline T wave inversions.  Normal blood pressure response.  No LYNSEY or LVOT obstruction at rest or with exertion.    Catheterization:  -3/16/2020: normal LM, large LAD aneurysm 5-6 mm, angiographically normal other vessels.       Assessment and Plan  Festus Negrete JrAdi is a 53 y.o. male with non obstructive HCM and an LAD aneurysm who presents for consultation regarding HCM.     #Symptoms: His maximal wall thickness is 2.6 cm.  He describes NYHA class II mild dyspnea with carrying heavy objects.  He has no murmur at rest or with Valsalva and this correlates with no LVOT " obstruction at rest, with Valsalva or after stress echocardiogram.  He feels better after switching the metoprolol to verapamil.  It is reasonable to continue the verapamil given his extensive scar burden.    #Family Screening: His father  suddenly, but it was at age 75 and he had been a smoker for many years.  It is unknown whether he had HCM.  Reportedly, Festus's father also had a cousin who  in his 20s and several aunts and uncles who  suddenly in their middle-age.  None of them were noted to have HCM.  We will pursue genetic testing for Festus and arrange for his daughters to be screened.  He will also encourage his siblings to be screened.  His oldest daughter can be seen in my office with an echocardiogram through [facility].  His younger daughter should be screened through [facility].    #SCA risk: Overall, his significant risk factor is LGE seen on his cardiac MRI.  I reviewed the test without cardiac MRI expert who felt as though the apex was thin but contractile, and therefore not necessarily a true aneurysm.  His estimated LGE burden is greater than 15% and thus we need to seriously consider a primary prevention ICD given his SCA risk is at least moderate.  He has not had syncope, a known first-degree relative with sudden cardiac arrest at a young age, maximal wall thickness greater than 30 mm, or reduced ejection fraction.  We will have him wear a Holter monitor for 14 days to quantify any ventricular or atrial arrhythmias and decide on a primary prevention ICD in a shared decision-making fashion once that has been completed.    Will also discuss his case at the HCM Leadership Board once this workup has been done.    Follow up in 6 weeks.  He can follow-up with his primary cardiologist as needed.    Saúl Oropeza MD  Director, Sports Cardiology  [facility]      Part of this note was completed using dictation and voice recognition software. Please excuse minor  errors and typos.       Time Spent: I spent 44 minutes reviewing medical testing, obtaining medical history and counselling and educating on diagnosis and documenting clinical encounter.

## 2024-02-07 ENCOUNTER — APPOINTMENT (OUTPATIENT)
Dept: CARDIOLOGY | Facility: CLINIC | Age: 54
End: 2024-02-07
Payer: COMMERCIAL

## 2024-02-08 ENCOUNTER — OFFICE VISIT (OUTPATIENT)
Dept: CARDIOLOGY | Facility: CLINIC | Age: 54
End: 2024-02-08
Payer: COMMERCIAL

## 2024-02-08 ENCOUNTER — HOSPITAL ENCOUNTER (OUTPATIENT)
Dept: CARDIOLOGY | Facility: CLINIC | Age: 54
Discharge: HOME | End: 2024-02-08
Payer: COMMERCIAL

## 2024-02-08 ENCOUNTER — APPOINTMENT (OUTPATIENT)
Dept: CARDIOLOGY | Facility: CLINIC | Age: 54
End: 2024-02-08
Payer: COMMERCIAL

## 2024-02-08 VITALS
HEART RATE: 78 BPM | OXYGEN SATURATION: 93 % | WEIGHT: 193.06 LBS | BODY MASS INDEX: 25.59 KG/M2 | SYSTOLIC BLOOD PRESSURE: 111 MMHG | DIASTOLIC BLOOD PRESSURE: 72 MMHG | HEIGHT: 73 IN

## 2024-02-08 DIAGNOSIS — I42.2 HYPERTROPHIC CARDIOMYOPATHY (MULTI): Primary | ICD-10-CM

## 2024-02-08 DIAGNOSIS — I42.2 HYPERTROPHIC CARDIOMYOPATHY (MULTI): ICD-10-CM

## 2024-02-08 DIAGNOSIS — R06.09 DOE (DYSPNEA ON EXERTION): ICD-10-CM

## 2024-02-08 DIAGNOSIS — R07.9 CHEST PAIN, UNSPECIFIED TYPE: ICD-10-CM

## 2024-02-08 PROCEDURE — 93325 DOPPLER ECHO COLOR FLOW MAPG: CPT

## 2024-02-08 PROCEDURE — 93016 CV STRESS TEST SUPVJ ONLY: CPT | Performed by: INTERNAL MEDICINE

## 2024-02-08 PROCEDURE — 93005 ELECTROCARDIOGRAM TRACING: CPT | Performed by: INTERNAL MEDICINE

## 2024-02-08 PROCEDURE — 93246 EXT ECG>7D<15D RECORDING: CPT

## 2024-02-08 PROCEDURE — 99214 OFFICE O/P EST MOD 30 MIN: CPT | Performed by: INTERNAL MEDICINE

## 2024-02-08 PROCEDURE — 93018 CV STRESS TEST I&R ONLY: CPT | Performed by: INTERNAL MEDICINE

## 2024-02-08 PROCEDURE — 3078F DIAST BP <80 MM HG: CPT | Performed by: INTERNAL MEDICINE

## 2024-02-08 PROCEDURE — 93321 DOPPLER ECHO F-UP/LMTD STD: CPT | Performed by: INTERNAL MEDICINE

## 2024-02-08 PROCEDURE — 93350 STRESS TTE ONLY: CPT | Performed by: INTERNAL MEDICINE

## 2024-02-08 PROCEDURE — 93325 DOPPLER ECHO COLOR FLOW MAPG: CPT | Performed by: INTERNAL MEDICINE

## 2024-02-08 PROCEDURE — 3074F SYST BP LT 130 MM HG: CPT | Performed by: INTERNAL MEDICINE

## 2024-02-08 PROCEDURE — 93248 EXT ECG>7D<15D REV&INTERPJ: CPT | Performed by: INTERNAL MEDICINE

## 2024-02-08 ASSESSMENT — COLUMBIA-SUICIDE SEVERITY RATING SCALE - C-SSRS
1. IN THE PAST MONTH, HAVE YOU WISHED YOU WERE DEAD OR WISHED YOU COULD GO TO SLEEP AND NOT WAKE UP?: NO
6. HAVE YOU EVER DONE ANYTHING, STARTED TO DO ANYTHING, OR PREPARED TO DO ANYTHING TO END YOUR LIFE?: NO
2. HAVE YOU ACTUALLY HAD ANY THOUGHTS OF KILLING YOURSELF?: NO

## 2024-02-08 ASSESSMENT — PATIENT HEALTH QUESTIONNAIRE - PHQ9
2. FEELING DOWN, DEPRESSED OR HOPELESS: NOT AT ALL
1. LITTLE INTEREST OR PLEASURE IN DOING THINGS: NOT AT ALL
SUM OF ALL RESPONSES TO PHQ9 QUESTIONS 1 AND 2: 0

## 2024-02-08 ASSESSMENT — PAIN SCALES - GENERAL: PAINLEVEL: 0-NO PAIN

## 2024-02-08 ASSESSMENT — ENCOUNTER SYMPTOMS
OCCASIONAL FEELINGS OF UNSTEADINESS: 0
LOSS OF SENSATION IN FEET: 0
DEPRESSION: 0

## 2024-02-08 NOTE — Clinical Note
Can you call his wife to get his kids screened. I can see the older one but both need to have their echo through [facility] (older kid is 17 or 17), just make sure they don't also schedule an appt with a cardiologist for the older one. I can order the echo once they have an appt date.

## 2024-02-08 NOTE — PATIENT INSTRUCTIONS
[email or web address]  You have nonobstructive hypertrophic cardiomyopathy.  We will do a genetic test for you and we will also have you wear a rhythm monitor for 2 weeks to see if you are having abnormal heart rhythms.  We will then need to determine how high risk marker for sudden death from this condition and determine whether you need a defibrillator prophylactically.  All of your first-degree relatives should be screened with an echocardiogram and EKG because of your history of hypertrophic cardiomyopathy.  If you would like us to help set up these appointments, [facility] can help you do that. I will want to see you in 6 weeks to go over everything.

## 2024-02-08 NOTE — Clinical Note
Thank you for referring Mr Negrete for a comprehensive HCM evaluation. I included my note here. -nazia

## 2024-02-09 ENCOUNTER — TELEPHONE (OUTPATIENT)
Dept: CARDIOLOGY | Facility: HOSPITAL | Age: 54
End: 2024-02-09
Payer: COMMERCIAL

## 2024-02-09 NOTE — TELEPHONE ENCOUNTER
Wife returns call.  Eldest daughter Celeste (17) seems to be exhibiting concerning symptoms for HCM.  Episodes of lightheadedness, bouts of feeling dizzy, & near syncopal that have been going on since age of 15.  Mom states that daughter actually passed out & was taken to [facility] but nothing was uncovered. Informed Mom we will get records from .  Celeste also carries a dx of childhood asthma.  Reviewed with Dr. Oropeza.  Discussed with mom that Celeste will need a peds echocardiogram (scheduled on 2/16/24) & a NPV with Dr. Oropeza on 2/19/24 @ [facility] location.    Daughter Flavia (14) scheduled with Dr. Starla Siu for HM screening appt on 4/2/24.

## 2024-02-09 NOTE — TELEPHONE ENCOUNTER
Left message for patient's wife to return call to review Dr. Davey's recommendations for getting daughters screened for HCM.  Dr davey would like both daughter to get screening echos done @ [facility] in peds cardiology.   will see eldest daughter as NPV after echo completed.  Younger daughter will jass to establish an NPV with Dr. Jocelyn Siu [phone]

## 2024-02-14 LAB — BODY SURFACE AREA: 2.12 M2

## 2024-02-14 NOTE — ADDENDUM NOTE
Encounter addended by: Caitlin Paniagua RN on: 2/14/2024 3:33 PM   Actions taken: Imaging Exam ended

## 2024-02-22 ENCOUNTER — APPOINTMENT (OUTPATIENT)
Dept: CARDIOLOGY | Facility: CLINIC | Age: 54
End: 2024-02-22
Payer: COMMERCIAL

## 2024-02-23 ENCOUNTER — TELEMEDICINE CLINICAL SUPPORT (OUTPATIENT)
Dept: GENETICS | Facility: CLINIC | Age: 54
End: 2024-02-23
Payer: COMMERCIAL

## 2024-02-23 DIAGNOSIS — I42.2 HYPERTROPHIC CARDIOMYOPATHY (MULTI): ICD-10-CM

## 2024-02-23 PROCEDURE — 96040 PR MEDICAL GENETICS COUNSELING EACH 30 MINUTES: CPT | Performed by: GENETIC COUNSELOR, MS

## 2024-02-23 NOTE — PROGRESS NOTES
Subjective   Patient ID: Festus Negrete Jr. is a 53 y.o. male who presents to cardiovascular genetics clinic due to a personal history of hypertrophic cardiomyopathy, and a family history of an enlarged heart. We spoke with him today over the phone to review the hereditary aspects of hypertrophic cardiomyopathy and the genetic testing options available to him.    Background Information: Festus presented to the ED at 49 years old after experiencing chest discomfort and chest pressure for 2 weeks. Cardiac catheterization demonstrated a 5-6 mm aneurysm in the mid left anterior descending coronary artery. His echocardiographic study demonstrated asymmetric septal hypertrophy. Cardiac MRI was consistent with hypertrophic cardiomyopathy.     Family History:  A three generation pedigree was collected, and was concerning for the following:    -Father  of an MI in his 70s.  -Paternal grandmother had an enlarged heart,  of an MI in her 60s.  -Three paternal great aunts/uncles  of MI's in their 50s/60s    Festus Negrete Jr. is of [place] descent.  Consanguinity and Ashkenazi [place] ancestry was denied.    The rest of the family history was negative for intellectual disability, autism, birth defects, multiple miscarriages, early onset cancer or kidney concerns, and other hereditary conditions.    Assessment/Plan   Genetic Test Ordered: yes    Genetic Counseling:  Hypertrophic cardiomyopathy (HCM) is a condition in which the muscles of the left ventricle thicken, and is most commonly due to a mutation of one of the genes currently known to encode different components of the sarcomere and is inherited in an autosomal dominant manner. For individuals with a family history of HCM, a pathogenic mutation (known harmful gene change) in one of the sarcomere genes can be found in 50-60% of individuals. For individuals without a family history (simplex case), a pathogenic mutation can be found in around 20-30%  of individuals.    The most common genes in which mutations are found in individuals with HCM are MYH7, MYBPC3, TNNT2, and TNNI3, however, mutations in many other genes have been found in individuals with HCM. Some individuals with HCM will actually have more than one mutation (either 2 mutations in the same gene, or 2 mutations in different genes). Because of the genetic heterogeneity seen in HCM, a broad panel approach to genetic testing is recommended.    We reviewed options for testing, specifically discussing a hypertrophic cardiomyopathy panel through [facility] that would examine 30 genes associated with the condition. We also reviewed the various possible test results, including positive, negative, and variant of unknown significance (VUS).    After discussion, the patient elected to proceed with genetic testing. A saliva collection kit will be sent to his home. Results should be available in 3-4 weeks and will be called out to the patient. Should a likely pathogenic or pathogenic mutation be identified, we will be able to offer family members targeted testing.  If no mutation is identified, cardiac screening for all 1st-degree relatives will be recommended.    Dora Dugan MS [facility]  Licensed Genetic Counselor  [facility]  [phone]    Reviewed by:  Sandrine Villarreal MD  Clinical   [facility]    Total time spent on day of encounter: 28 minutes

## 2024-02-29 LAB — BODY SURFACE AREA: 2.12 M2

## 2024-03-11 ENCOUNTER — TELEPHONE (OUTPATIENT)
Dept: CARDIOLOGY | Facility: HOSPITAL | Age: 54
End: 2024-03-11

## 2024-03-26 NOTE — PROGRESS NOTES
[facility]   [facility]omy[facility]    Primary Care Physician: Madelyn Ruff DO  Primary Cardiologist:  Dr. Xavier Layton/Dr. Del He  Date of Visit: 2024  8:00 AM EDT     HPI / Summary:   Festus Negrete Jr. is a 53 y.o. 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     He initially presented for cardiac care in 2020 with a picture consistent with acute coronary syndrome.  He had a coronary angiogram which showed a 5 to 6 mm aneurysm in the mid LAD but otherwise his coronary arteries were normal.  He was on DAPT for 1 month and then continued on aspirin 81 mg daily.  Echocardiogram at the time showed asymmetric septal hypertrophy with no LV outflow tract obstruction.    As of 2024, he had mild dyspnea on exertion with carrying heavy things. He also occasionally had tightness on his right side that goes down his arm. No orthopnea or PND. He saw Dr. Xavier Layton (2024) who changed his metoprolol 25 mg XL to verapamil 120 mg due to fatigue. He has been feeling better off metoprolol. Stress echo on 2024 showed no LYNSEY or LVOT obstruction at rest or with exertion. No arrhythmias.     Since last visit, his older daughter was screened with echo and was negative.  His 2-week Holter monitor showed rare PACs and PVCs.  Minimal NSVT and SVT. He is overall feeling well and has shortness of breath when carrying heavy objects up stairs.       HCM History  The patient was initially 2020 with LVH. He reports only being told that he has HCM in .   Family History of HCM: None  NYHA Class: II  Wall thickness: 2.6 cm on cardiac MRI 2023  EF: ~60%,   LVOT obstruction: none  ICD or PPM: none  Prior septal reduction therapy: none  Genetic Testing: pending  Parents: Father  at age 75 suddenly. Mother - breast cancer  Children: 2 kids - Celeste (b ), Flavia (b )  -   Siblings: Sister Apolonia - schizophrenia, Sister Aziza - healthy, Brother Steven - healthy  Grandmother  in her 60s  Fathers cousin  in his 20s   - wife Danielle   Father's aunts and uncles  suddenly    Sudden Cardiac Arrest Risk Factors:   No syncope  No wall thickness above 30 mm.   No apical aneurysm after discussion with our CMR expert (thin, but contractile)  Ambulatory monitoring benign as of   No EF less than 50%  LGE >15% per discussion with CMR expert  No family history of SCA in a family member with HCM  SCA risk: with high LGE burden likely moderate.     ROS: Relevant review of symptoms of negative unless discussed above.     Problems:   Patient Active Problem List   Diagnosis    NSTEMI (non-ST elevated myocardial infarction) (CMS/HCC)    Coronary artery aneurysm    Tobacco abuse    Hyperlipidemia    Hypertrophic cardiomyopathy (CMS/HCC)    Benign essential hypertension    Cervical radiculopathy, acute    Chest pain    Contusion of rib on right side    Deviated nasal septum    Diverticulitis of colon with perforation    Moderate persistent asthma    Nasal dryness    Hypertrophy of inferior nasal turbinate    Periumbilical abdominal pain    RUBY (dyspnea on exertion)    Chronic stable angina    Moderate mitral regurgitation    Diverticulitis    Neoplasm of appendix    Serrated polyp of colon    Aneurysm of coronary vessels    Coronary artery calcification       Medical History:   Past Medical History:   Diagnosis Date    Chest pain, atypical 09/10/2023    Chronic stable angina 10/20/2023    Contusion of rib on right side 09/10/2023    Coronary artery aneurysm 2023    Dr. He    Deviated nasal septum 09/10/2023    Diverticulitis of colon with perforation 2023    Hyperlipidemia 2023    Hypertrophic cardiomyopathy (CMS/HCC) 09/10/2023    Hypertrophy of inferior nasal turbinate 09/10/2023    Moderate persistent asthma, uncomplicated 2020    Moderate  "persistent asthma    NSTEMI (non-ST elevated myocardial infarction) (CMS/[facility]) 05/31/2023    Tobacco abuse 05/31/2023       Surgical Hx:   Past Surgical History:   Procedure Laterality Date    OTHER SURGICAL HISTORY  03/25/2020    Cardiac catheterization    OTHER SURGICAL HISTORY  03/25/2020    Foot surgery        Family Hx:   Family History   Problem Relation Name Age of Onset    Breast cancer Mother      Bone cancer Mother      Heart attack Father      Coronary artery disease Father      Other (healthy adult) Father      Schizophrenia Sister      Bipolar disorder Sister          Social Hx:  Fun: camper, ride [employer]  Occupation/School: Self employed el contractor.   EtOH/Smoking/Drugs: 1.5 packs per day for 40 years, 5-6 beers at a time a few days per week.     Medications  Current Outpatient Medications   Medication Instructions    albuterol 90 mcg/actuation inhaler 2 puffs, inhalation, Every 4 hours PRN    aspirin 81 mg EC tablet 1 tablet, oral, Daily    atorvastatin (LIPITOR) 40 mg, oral, Nightly    fluticasone (Flonase Allergy Relief) 50 mcg/actuation nasal spray 2 sprays, Each Nostril, Daily    fluticasone-umeclidin-vilanter (Trelegy Ellipta) 200-62.5-25 mcg blister with device 1 puff, inhalation, Daily    nitroglycerin (NITROSTAT) 0.4 mg, sublingual, Every 5 min PRN, May repeat dose every 5 minutes for up to 3 doses total.    verapamil SR (CALAN-SR) 120 mg, oral, Nightly, Do not crush or chew.       Allergies  Tree nuts, Egg, and Morphine    Exam:   Vitals: /73 (BP Location: Left leg, Patient Position: Sitting)   Pulse 67   Ht 1.854 m (6' 1\")   Wt 92.1 kg (203 lb)   SpO2 95%   BMI 26.78 kg/m²   Wt Readings from Last 5 Encounters:   03/28/24 92.1 kg (203 lb)   02/08/24 87.6 kg (193 lb 1 oz)   01/30/24 88.9 kg (196 lb)   12/06/23 87.4 kg (192 lb 9.6 oz)   12/04/23 87.5 kg (193 lb)     GEN: Pleasant, well-appearing, no acute distress.  HEENT: JVP not elevated  CHEST: Clear to auscultation, No " "wheeze, good air movement.  CV: normal rate, regular rhythm, no murmurs at rest   NEURO: grossly non focal  SKIN: No obvious rashes     Labs:   Lipids  Lab Results   Component Value Date    CHOL 149 12/04/2023     Lab Results   Component Value Date    HDL 61.7 12/04/2023     Lab Results   Component Value Date    LDLCALC 68 12/04/2023     Lab Results   Component Value Date    TRIG 96 12/04/2023     No components found for: \"CHOLHDL\"    Hemoglobin A1C  No results found for: \"HGBA1C\"    BMP  Lab Results   Component Value Date    GLUCOSE 80 12/04/2023    CALCIUM 9.4 12/04/2023     12/04/2023    K 4.4 12/04/2023    CO2 26 12/04/2023     12/04/2023    BUN 19 12/04/2023    CREATININE 0.79 12/04/2023         Notable Studies: imaging personally reviewed and summarized by me below  EKG:  -2/8/2024: NSR, resting HR 65 bpm, TWI in II, III, avF and V4-6    Echo:  -10/13/2023: IVS 2.6 cm, no LYNSEY or LVOT obstruction    Ambulatory Rhythm Monitor:   -2/8/2024 - 2/24/2024: Maximum heart rate 179, minimum heart rate 54, average heart rate 74 beats minute, less than 1% PVC burden, 9 occurrences of NSVT with the fastest being 160 bpm and longest being 7 beats, less than 1% PAC burden, 9 occurrences of SVT with the fastest being 179 bpm and longest being 14 beats.    Cardiac MRI:  -11/2023: Maximal thickness 2.6 cm at the mid inferoseptal/anteroseptal walls.  [place] is slightly aneurysmal, no LYNSEY or LV outflow tract obstruction.  LVEF 57%. There is subepicardial mid wall late gadolinium enhancement at the level of the basal anteroseptal and anterior wall as well as the apical inferior wall. Estimated scar burden of 8%.  Mild mitral regurgitation. On discussion with our HCM CMR [place] his scar burden is >15%.     Stress Test:  -2/8/2024: Stage III post protocol, 9 minutes, 10 METS, peak heart rate 129 bpm, 77% of age-predicted heart rate, stopped due to fatigue and dyspnea, ECG showed exaggeration of baseline T wave inversions.  " Normal blood pressure response.  No LYNSEY or LVOT obstruction at rest or with exertion.    Catheterization:  -3/16/2020: normal LM, large LAD aneurysm 5-6 mm, angiographically normal other vessels.       Assessment and Plan  Festus Negrete Jr. is a 53 y.o. 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     #Symptoms: His maximal wall thickness is 2.6 cm.  He describes NYHA class II mild dyspnea with carrying heavy objects.  He has no murmur at rest or with Valsalva and this correlates with no LVOT obstruction at rest, with Valsalva or after stress echocardiogram.  He feels better after switching the metoprolol to verapamil.  It is reasonable to continue the verapamil given his extensive scar burden.    #Family Screening: His father  suddenly, but it was at age 75 and he had been a smoker for many years.  It is unknown whether he had HCM.  Reportedly, Festus's father also had a cousin who  in his 20s and several aunts and uncles who  suddenly in their middle-age.  None of them were noted to have HCM.  We will pursue genetic testing for Festus and arrange for his daughters to be screened.  Oldest daughter is negative as of  (by echo).     #SCA risk: Overall, his significant risk factor is LGE seen on his cardiac MRI.  I reviewed the test with our cardiac MRI expert who felt as though the apex was thin but contractile, and therefore not necessarily a true aneurysm.  His estimated LGE burden is greater than 15% and thus we discussed a primary prevention ICD given his SCA risk is at least moderate.  He has not had syncope, a known first-degree relative with sudden cardiac arrest at a young age, maximal wall thickness greater than 30 mm, or reduced ejection fraction. Holter showed limited NSVT with the fastest being 160 bpm and the longest being 7 beats. By the guidelines, this would not mandate an ICD. We discussed an ICD as the most conservative  option, or possibly a loop recorder. We also discussed simple rhythm monitoring every year. He would like to think about it and come back in 4-6 weeks with his wife to make a final decision.     Will also discuss his case at the HCM Leadership Board once he makes a final decision.     Follow up in 4-6 weeks with his wife.     Saúl Oropeza MD  Director, Sports Cardiology  [facility]    Part of this note was completed using dictation and voice recognition software. Please excuse minor errors and typos.     Time Spent: I spent 30 minutes reviewing medical testing, obtaining medical history and counselling and educating on diagnosis and documenting clinical encounter.

## 2024-03-28 ENCOUNTER — OFFICE VISIT (OUTPATIENT)
Dept: CARDIOLOGY | Facility: CLINIC | Age: 54
End: 2024-03-28
Payer: COMMERCIAL

## 2024-03-28 VITALS
WEIGHT: 203 LBS | HEIGHT: 73 IN | DIASTOLIC BLOOD PRESSURE: 73 MMHG | BODY MASS INDEX: 26.9 KG/M2 | HEART RATE: 67 BPM | OXYGEN SATURATION: 95 % | SYSTOLIC BLOOD PRESSURE: 120 MMHG

## 2024-03-28 DIAGNOSIS — I42.2 HYPERTROPHIC CARDIOMYOPATHY (MULTI): Primary | ICD-10-CM

## 2024-03-28 PROCEDURE — 4004F PT TOBACCO SCREEN RCVD TLK: CPT | Performed by: INTERNAL MEDICINE

## 2024-03-28 PROCEDURE — 3078F DIAST BP <80 MM HG: CPT | Performed by: INTERNAL MEDICINE

## 2024-03-28 PROCEDURE — 99214 OFFICE O/P EST MOD 30 MIN: CPT | Performed by: INTERNAL MEDICINE

## 2024-03-28 PROCEDURE — 3074F SYST BP LT 130 MM HG: CPT | Performed by: INTERNAL MEDICINE

## 2024-03-28 ASSESSMENT — PAIN SCALES - GENERAL: PAINLEVEL: 0-NO PAIN

## 2024-03-28 NOTE — PATIENT INSTRUCTIONS
We talked about your risk for sudden death with HCM.  You have 1 risk factor for sudden death which is excessive scar on your cardiac MRI.  Our national guidelines say that you could consider a defibrillator for the prevention of sudden death (it would shock you if you had a lethal heart rhythm to get you out of the heart rhythm) but it is not absolutely necessary.  This would be the safest and most conservative route, but it is also the one that involves an invasive procedure.  The second option would be to put a small rhythm monitor, the size of a paperclip, underneath your skin which could come out in a few years and does not require any access to the veins or arteries.  It is about a 15-minute procedure and we would have continuous monitoring of your heart rhythm.  However it cannot intervene if there is any abnormal heart rhythm, it is simply a monitor.  The third option would be to not do anything invasive and just monitor you every year with a patch monitor on your skin and possibly a stress test.  It is really up to your values.  I would recommend that you speak with your family about this and may an appointment for 1 month from now and bring your wife with you.

## 2024-04-05 ENCOUNTER — TELEPHONE (OUTPATIENT)
Dept: CARDIOLOGY | Facility: HOSPITAL | Age: 54
End: 2024-04-05
Payer: COMMERCIAL

## 2024-04-05 ENCOUNTER — MULTIDISCIPLINARY MEETING (OUTPATIENT)
Dept: CARDIOLOGY | Facility: HOSPITAL | Age: 54
End: 2024-04-05
Payer: COMMERCIAL

## 2024-04-05 NOTE — PROGRESS NOTES
Hypertrophic Cardiomyopathy Clinical Board Meeting  Date of meetin2024    Participants: Marcella Campos, Flori Mcwilliams, Eber Raphael, Delroy Lagunas, Regan Hanley, Starla Siu, Karla Zavala, Trista Li, Napoleon Oropeza, Ember Hairston. Staci Alvarado, Chaparrita Rasmussen.     Patient: Festus Negrete,  1970, MRN [id number]    Referred by: Calin    Primary clinician: Johnna    Date of referral: 2024     Purpose of referral: Diagnostic dilemma, Medical management    Heart failure type: HFpEF (LVEF >/= 50%)    Left ventricular ejection fraction (%): 75    Clinical summary: 53WM w/ h/o HCM (no obst), MR, cor aneurysm (LAD), CAC, asthma, TOB now here for cardiology consult. +occ R chest tight at rest x 10-15 minutes, no radiation, no assoc symptoms (less on BB). No chest pain. No dyspnea at rest. +RUBY (mild-mod exertion). No orthopnea/PND. No palps. No LH/dizzy/syncope. No edema. No claudication. +occ cough. +fatigue on BB. +snoring.  HCM Board - SCA risk & should consideration be made for primary prevention ICD. Overall, his significant risk factor is LGE seen on his cardiac MRI. I reviewed the test without cardiac MRI expert who felt as though the apex was thin but contractile, and therefore not necessarily a true aneurysm. His estimated LGE burden is greater than 15% and thus we need to seriously consider a primary prevention ICD given his SCA risk is at least moderate.    Cath 3/2020: LAD aneurysm, no CAD, LVEDP 9    CT chest 2023: mod CAC, mild CM, no peric eff, min AA, no aneurysm    ECG 2024: SR (61),LAE, IL ST-T changes    Echo 10/2023: EF 60%, DD, mod LAE, sev NADIR - prox-min septum (IVS 1.75, PW 1.2), no LVOT bst, mod MR    cMRI 2023: EF 57%, HCM (mid-apical septum 2.6cm), no LVOT obst, apex slight aneurysm, mild MR    Stress Echo [24] Submaximal level of stress achieved. 2. The resting ejection fraction was estimated at 60 to 65% with a peak exercise ejection fraction  estimated at 70 to 75%. 3. Normal global left ventricular systolic function. 4. Resting echocardiogram showed IVSd 2.6 cm and no systolic anterior motion of the mitral valve or appreciable LVOT gradient. 5. At peak exercise there is no LYNSEY or LVOT obstruction (gradient <10 mmHg). There is mild mitral regurgitation. 6. The study is non-diagnostic for ischemia due to reaching only 77% of age predicted maximal heart rate and focus on LVOT obstruction evaluation. However, there was no ECG, clinical or echocardiographic suggestion of ischemia at the submaximal level of exertion.    Holter [2-8-24] SR  (ave rate 74) 9 occurrences of VT. Rate 160. longest 7 beat run 9 occurrences of SVT. Rate 179 longest 14 beat run    CV genetics:[2-24-24] VUS [id number]    Meds: verapamil 120, atorvastatin, albuterol & trelegy inhalers.    Board conclusions: Management dilemma identified.    Board recommendations: Evaluate for ICD. Board concludes given his young age, and high LGE burden, primary prevention ICD is strongly recommended.

## 2024-04-05 NOTE — TELEPHONE ENCOUNTER
At the HCM board meeting today we discussed his case.  Overall, given his young age and high LGE burden, we would favor primary prevention ICD.  I called Mr. Negrete and explained our conversation and he was understanding.  He will speak with his wife and once they talk he will call the HCM center and make an appointment with EP for ICD implantation.

## 2024-04-18 NOTE — PROGRESS NOTES
[facility] [place]   Hypertrophic Cardiomyopathy Center    Primary Care Physician: Madelyn Ruff DO  Primary Cardiologist:  Dr. Xavier Layton  Date of Visit: 2024  8:40 AM EDT     HPI / Summary:   Festus Negrete Jr. is a 53 y.o. 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     He initially presented for cardiac care in 2020 with a picture consistent with acute coronary syndrome.  He had a coronary angiogram which showed a 5 to 6 mm aneurysm in the mid LAD but otherwise his coronary arteries were normal.  He was on DAPT for 1 month and then continued on aspirin 81 mg daily.  Echocardiogram at the time showed asymmetric septal hypertrophy with no LV outflow tract obstruction.    His case was discussed at the HCM board meeting in early 2024.  Given his young age and high degree of late gadolinium enhancement, a primary prevention ICD strongly recommended.  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 His daughters were screened with echo earlier this year and both did not have evidence of HCM.     HCM History  The patient was initially 2020 with LVH. He reports only being told that he has HCM in .   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 Mother - breast cancer  Children: 2 kids - Celeste (b ), Flavia (b ) - screened in  and negative  Siblings: Sister Apolonia - schizophrenia, Sister Aziza - healthy, Brother Steven - healthy  Grandmother  in her 60s  Fathers cousin  in his 20s   - wife Danielle   Father's aunts and uncles   suddenly    Sudden Cardiac Arrest Risk Factors:   No syncope  No wall thickness above 30 mm.   No apical aneurysm after discussion with our CMR expert (thin, but contractile)  Ambulatory monitoring benign as of 2024  No EF less than 50%  LGE >15% per discussion with CMR expert  No family history of SCA in a family member with HCM  SCA risk: with high LGE burden likely moderate-high.     ROS: Relevant review of symptoms of negative unless discussed above.     Problems:   Patient Active Problem List   Diagnosis    NSTEMI (non-ST elevated myocardial infarction) (Multi)    Coronary artery aneurysm    Tobacco abuse    Hyperlipidemia    Hypertrophic cardiomyopathy (Multi)    Benign essential hypertension    Cervical radiculopathy, acute    Chest pain    Contusion of rib on right side    Deviated nasal septum    Diverticulitis of colon with perforation    Moderate persistent asthma (HHS-HCC)    Nasal dryness    Hypertrophy of inferior nasal turbinate    Periumbilical abdominal pain    RUBY (dyspnea on exertion)    Chronic stable angina (CMS-HCC)    Moderate mitral regurgitation    Diverticulitis    Neoplasm of appendix    Serrated polyp of colon    Aneurysm of coronary vessels    Coronary artery calcification       Medical History:   Past Medical History:   Diagnosis Date    Chest pain, atypical 09/10/2023    Chronic stable angina (CMS-HCC) 10/20/2023    Contusion of rib on right side 09/10/2023    Coronary artery aneurysm 05/31/2023    Dr. He    Deviated nasal septum 09/10/2023    Diverticulitis of colon with perforation 06/25/2023    Hyperlipidemia 05/31/2023    Hypertrophic cardiomyopathy (Multi) 09/10/2023    Hypertrophy of inferior nasal turbinate 09/10/2023    Moderate persistent asthma, uncomplicated (HHS-HCC) 02/25/2020    Moderate persistent asthma    NSTEMI (non-ST elevated myocardial infarction) (Multi) 05/31/2023    Tobacco abuse 05/31/2023       Surgical Hx:   Past Surgical History:   Procedure Laterality  "Date    OTHER SURGICAL HISTORY  03/25/2020    Cardiac catheterization    OTHER SURGICAL HISTORY  03/25/2020    Foot surgery        Family Hx:   Family History   Problem Relation Name Age of Onset    Breast cancer Mother      Bone cancer Mother      Heart attack Father      Coronary artery disease Father      Other (healthy adult) Father      Schizophrenia Sister      Bipolar disorder Sister        Social Hx:  Fun: camper, ride cassandra  Occupation/School: Self employed el contractor.   EtOH/Smoking/Drugs: 1.5 packs per day for 40 years, 5-6 beers at a time a few days per week.     Medications  Current Outpatient Medications   Medication Instructions    albuterol 90 mcg/actuation inhaler 2 puffs, inhalation, Every 4 hours PRN    aspirin 81 mg EC tablet 1 tablet, oral, Daily    atorvastatin (LIPITOR) 40 mg, oral, Nightly    fluticasone (Flonase Allergy Relief) 50 mcg/actuation nasal spray 2 sprays, Each Nostril, Daily    fluticasone-umeclidin-vilanter (Trelegy Ellipta) 200-62.5-25 mcg blister with device 1 puff, inhalation, Daily    nitroglycerin (NITROSTAT) 0.4 mg, sublingual, Every 5 min PRN, May repeat dose every 5 minutes for up to 3 doses total.    verapamil SR (CALAN-SR) 120 mg, oral, Nightly, Do not crush or chew.       Allergies  Tree nuts, Egg, and Morphine    Exam:   Vitals: /84 (BP Location: Left arm, Patient Position: Sitting, BP Cuff Size: Adult)   Pulse 68   Resp 20   Ht 1.854 m (6' 1\")   Wt 89.4 kg (197 lb)   SpO2 96%   BMI 25.99 kg/m²   Wt Readings from Last 5 Encounters:   04/22/24 89.4 kg (197 lb)   03/28/24 92.1 kg (203 lb)   02/08/24 87.6 kg (193 lb 1 oz)   01/30/24 88.9 kg (196 lb)   12/06/23 87.4 kg (192 lb 9.6 oz)     GEN: Pleasant, well-appearing, no acute distress.  HEENT: JVP not elevated  CHEST: Clear to auscultation, No wheeze, good air movement.  CV: normal rate, regular rhythm, no murmurs at rest   NEURO: grossly non focal  SKIN: No obvious rashes     Labs:   Lipids  Lab " "Results   Component Value Date    CHOL 149 12/04/2023     Lab Results   Component Value Date    HDL 61.7 12/04/2023     Lab Results   Component Value Date    LDLCALC 68 12/04/2023     Lab Results   Component Value Date    TRIG 96 12/04/2023     No components found for: \"CHOLHDL\"    Hemoglobin A1C  No results found for: \"HGBA1C\"    BMP  Lab Results   Component Value Date    GLUCOSE 80 12/04/2023    CALCIUM 9.4 12/04/2023     12/04/2023    K 4.4 12/04/2023    CO2 26 12/04/2023     12/04/2023    BUN 19 12/04/2023    CREATININE 0.79 12/04/2023         Notable Studies: imaging personally reviewed and summarized by me below  EKG:  -2/8/2024: NSR, resting HR 65 bpm, TWI in II, III, avF and V4-6    Echo:  -10/13/2023: IVS 2.6 cm, no LYNSEY or LVOT obstruction    Ambulatory Rhythm Monitor:   -2/8/2024 - 2/24/2024: Maximum heart rate 179, minimum heart rate 54, average heart rate 74 beats minute, less than 1% PVC burden, 9 occurrences of NSVT with the fastest being 160 bpm and longest being 7 beats, less than 1% PAC burden, 9 occurrences of SVT with the fastest being 179 bpm and longest being 14 beats.    Cardiac MRI:  -11/2023: Maximal thickness 2.6 cm at the mid inferoseptal/anteroseptal walls.  [place] is slightly aneurysmal, no LYNSEY or LV outflow tract obstruction.  LVEF 57%. There is subepicardial mid wall late gadolinium enhancement at the level of the basal anteroseptal and anterior wall as well as the apical inferior wall. Estimated scar burden of 8%.  Mild mitral regurgitation. On discussion with our HCM CMR Nixa his scar burden is >15%.     Stress Test:  -2/8/2024: Stage III post protocol, 9 minutes, 10 METS, peak heart rate 129 bpm, 77% of age-predicted heart rate, stopped due to fatigue and dyspnea, ECG showed exaggeration of baseline T wave inversions.  Normal blood pressure response.  No LYNSEY or LVOT obstruction at rest or with exertion.    Catheterization:  -3/16/2020: normal LM, large LAD aneurysm 5-6 " mm, angiographically normal other vessels.       Assessment and Plan  Festus Negrete Jr. is a 53 y.o. 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     #SCA risk: Overall, his significant risk factor is LGE seen on his cardiac MRI.  I reviewed the test with our cardiac MRI expert who felt as though the apex was thin but contractile, and therefore not necessarily a true aneurysm.  His estimated LGE burden is greater than 15% and thus we discussed a primary prevention ICD given his SCA risk is at least moderate. The HCM board felt that an ICD for primary prevention was indicated. He has not had syncope, a known first-degree relative with sudden cardiac arrest at a young age, maximal wall thickness greater than 30 mm, or reduced ejection fraction. Holter showed limited NSVT with the fastest being 160 bpm and the longest being 7 beats.   -Discussed with Festus and his wife Danielle - we will proceed with primary prevention ICD given his high LGE burden.     #Symptoms: His maximal wall thickness is 2.6 cm.  He describes NYHA class II mild dyspnea with carrying heavy objects.  He has no murmur at rest or with Valsalva and this correlates with no LVOT obstruction at rest, with Valsalva or after stress echocardiogram.  He felt better after switching the metoprolol to verapamil.  Continue verapamil.     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 His father  suddenly, but it was at age 75 and he had been a smoker for many years.  It is unknown whether he had HCM.  Reportedly, Festus's father also had a cousin who  in his 20s and several aunts and uncles who  suddenly in their middle-age.  Both of his daughters were screened in  and were negative. Recommend repeat screening every 2-3 years in the near future and screening until mid 50s at least.     Saúl Oropeza MD  Director, Sports Cardiology  Hypertrophic  Cardiomyopathy Center    Part of this note was completed using dictation and voice recognition software. Please excuse minor errors and typos.     Time Spent: I spent 30 minutes reviewing medical testing, obtaining medical history and counselling and educating on diagnosis and documenting clinical encounter.

## 2024-04-22 ENCOUNTER — OFFICE VISIT (OUTPATIENT)
Dept: CARDIOLOGY | Facility: HOSPITAL | Age: 54
End: 2024-04-22
Payer: COMMERCIAL

## 2024-04-22 VITALS
HEART RATE: 68 BPM | OXYGEN SATURATION: 96 % | RESPIRATION RATE: 20 BRPM | WEIGHT: 197 LBS | BODY MASS INDEX: 26.11 KG/M2 | DIASTOLIC BLOOD PRESSURE: 84 MMHG | SYSTOLIC BLOOD PRESSURE: 121 MMHG | HEIGHT: 73 IN

## 2024-04-22 DIAGNOSIS — I42.2 HYPERTROPHIC CARDIOMYOPATHY (MULTI): Primary | ICD-10-CM

## 2024-04-22 PROCEDURE — 99214 OFFICE O/P EST MOD 30 MIN: CPT | Performed by: INTERNAL MEDICINE

## 2024-04-22 PROCEDURE — 3079F DIAST BP 80-89 MM HG: CPT | Performed by: INTERNAL MEDICINE

## 2024-04-22 PROCEDURE — 3074F SYST BP LT 130 MM HG: CPT | Performed by: INTERNAL MEDICINE

## 2024-04-22 ASSESSMENT — ENCOUNTER SYMPTOMS
LOSS OF SENSATION IN FEET: 0
DEPRESSION: 0
OCCASIONAL FEELINGS OF UNSTEADINESS: 0

## 2024-04-22 NOTE — PATIENT INSTRUCTIONS
We will refer to the electrophysiologist for the ICD. Your kids should have screening every 2 years. See me in 6 months.

## 2024-04-25 ENCOUNTER — APPOINTMENT (OUTPATIENT)
Dept: CARDIOLOGY | Facility: CLINIC | Age: 54
End: 2024-04-25
Payer: COMMERCIAL

## 2024-05-16 PROBLEM — E55.9 VITAMIN D DEFICIENCY: Status: ACTIVE | Noted: 2024-05-16

## 2024-05-29 ENCOUNTER — OFFICE VISIT (OUTPATIENT)
Dept: CARDIOLOGY | Facility: CLINIC | Age: 54
End: 2024-05-29
Payer: COMMERCIAL

## 2024-05-29 VITALS
OXYGEN SATURATION: 95 % | SYSTOLIC BLOOD PRESSURE: 120 MMHG | HEIGHT: 73 IN | BODY MASS INDEX: 26.28 KG/M2 | WEIGHT: 198.3 LBS | DIASTOLIC BLOOD PRESSURE: 77 MMHG | HEART RATE: 68 BPM

## 2024-05-29 DIAGNOSIS — I42.2 HYPERTROPHIC CARDIOMYOPATHY (MULTI): Primary | ICD-10-CM

## 2024-05-29 PROCEDURE — 99214 OFFICE O/P EST MOD 30 MIN: CPT | Performed by: INTERNAL MEDICINE

## 2024-05-29 PROCEDURE — 3074F SYST BP LT 130 MM HG: CPT | Performed by: INTERNAL MEDICINE

## 2024-05-29 PROCEDURE — 93010 ELECTROCARDIOGRAM REPORT: CPT | Performed by: INTERNAL MEDICINE

## 2024-05-29 PROCEDURE — 4004F PT TOBACCO SCREEN RCVD TLK: CPT | Performed by: INTERNAL MEDICINE

## 2024-05-29 PROCEDURE — 3078F DIAST BP <80 MM HG: CPT | Performed by: INTERNAL MEDICINE

## 2024-05-29 PROCEDURE — 93005 ELECTROCARDIOGRAM TRACING: CPT | Performed by: INTERNAL MEDICINE

## 2024-05-29 ASSESSMENT — ENCOUNTER SYMPTOMS
LOSS OF SENSATION IN FEET: 0
OCCASIONAL FEELINGS OF UNSTEADINESS: 0
DEPRESSION: 0

## 2024-05-29 ASSESSMENT — PATIENT HEALTH QUESTIONNAIRE - PHQ9
1. LITTLE INTEREST OR PLEASURE IN DOING THINGS: NOT AT ALL
SUM OF ALL RESPONSES TO PHQ9 QUESTIONS 1 AND 2: 0
2. FEELING DOWN, DEPRESSED OR HOPELESS: NOT AT ALL

## 2024-05-29 ASSESSMENT — COLUMBIA-SUICIDE SEVERITY RATING SCALE - C-SSRS
1. IN THE PAST MONTH, HAVE YOU WISHED YOU WERE DEAD OR WISHED YOU COULD GO TO SLEEP AND NOT WAKE UP?: NO
2. HAVE YOU ACTUALLY HAD ANY THOUGHTS OF KILLING YOURSELF?: NO
6. HAVE YOU EVER DONE ANYTHING, STARTED TO DO ANYTHING, OR PREPARED TO DO ANYTHING TO END YOUR LIFE?: NO

## 2024-05-29 NOTE — PROGRESS NOTES
Referred by Saúl Lockett MD  for  ICD evaluation   Chief Complaint   Patient presents with    Cardiomyopathy         History Of Present Illness:    Festus Negrete Jr. is a 54 y.o. year old male patient with hypertropic obstructive cardiomyopathy being referred for ICD evaluation.    He initial presentation was for suspicion of acute coronary syndrome. He had a coronary angiogram which showed a 5 to 6 mm aneurysm in the mid LAD but otherwise his coronary arteries were normal. He had a coronary angiogram which showed a 5 to 6 mm aneurysm in the mid LAD but otherwise his coronary arteries were normal.     He was eventually discussed at the HCM board meeting. Given LGE > 15% and NSVT on monitor, he was recommended a primary prevention ICD. 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 He was thus referred to  for further evaluation.     On interview:    - No clear history of arrhythmic syncope, though notes exertional pre-syncope    - No clear family history of sudden death in a member known to have HCM. But unexplained deaths in paternal grandmother, paternal cousins many of whom were less than 55 years of age    - No palpitations, no chest pain / shortness of breath    - No orthopnea / paroxysmal nocturnal dyspnea     Past Medical History:  Past Medical History:   Diagnosis Date    Chest pain, atypical 09/10/2023    Chronic stable angina (CMS-HCC) 10/20/2023    Contusion of rib on right side 09/10/2023    Coronary artery aneurysm 05/31/2023    Dr. He    Deviated nasal septum 09/10/2023    Diverticulitis of colon with perforation 06/25/2023    Hyperlipidemia 05/31/2023    Hypertrophic cardiomyopathy (Multi) 09/10/2023    Hypertrophy of inferior nasal turbinate 09/10/2023    Moderate persistent asthma, uncomplicated ([facility]) 02/25/2020    Moderate persistent asthma    NSTEMI (non-ST elevated myocardial infarction) (Multi) 05/31/2023    Tobacco abuse  "05/31/2023       Past Surgical History:  Past Surgical History:   Procedure Laterality Date    OTHER SURGICAL HISTORY  03/25/2020    Cardiac catheterization    OTHER SURGICAL HISTORY  03/25/2020    Foot surgery         Social History:  Caffeine: Pepsi every day , up to 5 bottles of 500 ml each   Cigarettes: Up to 2 packs / day  ETOH: Every day social drinker  Drugs: None  Exercise: No formal exercise regimen   Occ:    Marital status: , lives with wife    Family History:  Family History   Problem Relation Name Age of Onset    Breast cancer Mother      Bone cancer Mother      Heart attack Father      Coronary artery disease Father      Other (healthy adult) Father      Schizophrenia Sister      Bipolar disorder Sister           Allergies:  Allergies   Allergen Reactions    Tree Nuts Shortness of breath    Egg Unknown    Morphine Other and Hives        Outpatient Medications:  Current Outpatient Medications   Medication Instructions    albuterol 90 mcg/actuation inhaler 2 puffs, inhalation, Every 4 hours PRN    aspirin 81 mg EC tablet 1 tablet, oral, Daily    atorvastatin (LIPITOR) 40 mg, oral, Nightly    fluticasone (Flonase Allergy Relief) 50 mcg/actuation nasal spray 2 sprays, Each Nostril, Daily    fluticasone-umeclidin-vilanter (Trelegy Ellipta) 200-62.5-25 mcg blister with device 1 puff, inhalation, Daily    nitroglycerin (NITROSTAT) 0.4 mg, sublingual, Every 5 min PRN, May repeat dose every 5 minutes for up to 3 doses total.    verapamil SR (CALAN-SR) 120 mg, oral, Nightly, Do not crush or chew.         Last Recorded Vitals:      12/6/2023     2:47 PM 1/30/2024    10:13 AM 1/30/2024    10:18 AM 2/8/2024     3:15 PM 2/8/2024     3:18 PM 3/28/2024     8:13 AM 4/22/2024     8:58 AM   Vitals   Systolic 118 118 115 119 111 120 121   Diastolic 66 79 79 75 72 73 84   Heart Rate 67 62  77 78 67 68   Resp       20   Height (in) 1.854 m (6' 1\")   1.854 m (6' 1\")  1.854 m (6' 1\") 1.854 m (6' 1\")   Weight (lb) " 192.6 196  193.06  203 197   BMI 25.41 kg/m2 25.86 kg/m2  25.47 kg/m2  26.78 kg/m2 25.99 kg/m2   BSA (m2) 2.12 m2 2.14 m2  2.12 m2  2.18 m2 2.15 m2   Visit Report Report Report Report Report Report Report Report        Physical Exam:  Physical Exam  Vitals and nursing note reviewed.   Constitutional:       Appearance: Normal appearance.   HENT:      Head: Normocephalic and atraumatic.   Cardiovascular:      Rate and Rhythm: Regular rhythm.   Pulmonary:      Effort: Pulmonary effort is normal.   Abdominal:      Palpations: Abdomen is soft.   Skin:     General: Skin is warm.      Capillary Refill: Capillary refill takes less than 2 seconds.   Neurological:      General: No focal deficit present.      Mental Status: He is alert.   Psychiatric:         Mood and Affect: Mood normal.           Last Cardiology Tests:  ECG:  ECG 12 lead (Clinic Performed) 01/30/2024  NSR + ST - T changes on inferior leads + ventricular rate 65 bpm + QRSd 90 ms + Qtc 420 ms + R wave axis normal    Echo:  Echocardiogram Stress Test 02/08/2024  Summary:   1. Submaximal level of stress achieved.   2. The resting ejection fraction was estimated at 60 to 65% with a peak exercise ejection fraction estimated at 70 to 75%.   3. Normal global left ventricular systolic function.   4. Resting echocardiogram showed IVSd 2.6 cm and no systolic anterior motion of the mitral valve or appreciable LVOT gradient.   5. At peak exercise there is no LYNSEY or LVOT obstruction (gradient <10 mmHg). There is mild mitral regurgitation.   6. The study is non diagnostic for ischemia due to reaching only 77% of age predicted maximal heart rate and focus on LVOT obstruction evaluation. However, there was no ECG, clinical or echocardiographic suggestion of ischemia at the submaximal level of exertion.   7. Dr. Oropeza in room during exercise portion.    Transthoracic Echo (TTE) Complete 10/13/2023  CONCLUSIONS:   1. Left ventricular systolic function is normal with a 60%  estimated ejection fraction.   2. Spectral Doppler shows an impaired relaxation pattern of left ventricular diastolic filling.   3. The left atrium is moderately dilated.   4. Moderate mitral valve regurgitation.   5. Slightly elevated RVSP.   6. There is severe asymmetric septal hypertrophy involving the proximal to mid septum.   7. There is not left ventricular outflow tract obstruction.   8. Definity study confirms above findings with primarily mid septal hypertrophy.      Cath:  Catheterization:  -3/16/2020: normal LM, large LAD aneurysm 5-6 mm, angiographically normal other vessels.    Holter Monitor 2/8/2024:          Cardiac Imaging:  MR cardiac morphology and function w and wo IV contrast 11/30/2023      MR CARDIAC MORPHOLOGY AND FUNCTION W AND WO IV CONTRAST;  11/30/2023  8:56 am      INDICATION:  Signs/Symptoms:Hypertrophic cardiomyopathy proximal to mid septum.  This study is performed to assess myocardial viability and damage,  and to quantitate left ventricular and valvular function.      COMPARISON:  None.      ACCESSION NUMBER(S):  [id number]      ORDERING CLINICIAN:  BEN VARELA      TECHNIQUE:  Siemens1.5  Sade MRI scanner.  Turbo spin echo and balanced steady state free precession (bSSFP)  imaging for anatomic definition. Dynamic cine bSSFP for cardiac  chamber and wall-motion analysis, and valvular analysis. Flow  quantification sequences for hemodynamics. Delayed gadolinium  enhancement analysis after injection of gadolinium-chelate (mL of  Dotarem, 0.2 mmol/kg).      HT- ; WT-; BSA-  m2      FINDINGS:  CARDIAC CHAMBERS  Normal atrioventricular and ventriculoarterial concordance      LEFT ATRIUM  Normal size      RIGHT ATRIUM  Normal size      INTERATRIAL SEPTUM  Intact.      LEFT VENTRICLE  There is severe LV hypertrophy. Maximal wall thickness of 2.6 cm at  the level of the mid infero septal/anteroseptal and apical septal  walls. The apex is slightly aneurysmal. No evidence of [place]  or  increase LV outflow tract.  There is asymmetric LVH. Quantitative  LVEF 57 %. LV cavity size is normal. LV systolic function is normal.  There is no LV mass/thrombus.      T2 star myocardium: 30 milliseconds  T2 star liver 20 milliseconds      T1 myocardium: 1052 milliseconds  T1 blood pool: 1547 milliseconds  T1 postcontrast myocardial: 493 milliseconds  T1 blood pool postcontrast: 390 milliseconds  ECV: 29%      There is subepicardial mid wall late gadolinium enhancement at the  level of the basal anteroseptal and anterior wall as well as the  apical inferior wall. Estimated scar burden of 8%.      RIGHT VENTRICLE  RV wall thickness is normal. RV cavity size is normal. RV systolic  function is normal. There is no RV mass/thrombus. There is no RV  fibro-fatty infiltration. Quantitative RVEF 62 %.      INTERVENTRICULAR SEPTUM  Intact.      AORTIC VALVE  Peak aortic valve velocity 169 cm/sec. Aortic regurgitant volume 0  ml. Aortic regurgitant fraction 1 %. Aortic valve is trileaflet.      MITRAL VALVE  Mitral regurgitant volume 1 ml. Peak mitral valve velocity 45 cm/sec.  Mitral regurgitant fraction 1 %. Mitral valve leaflets are normal.  Mitral valve is mildly thickened. There is mild mitral regurgitation.      TRICUSPID VALVE  Peak tricuspid valve velocity 149 cm/sec. Tricuspid regurgitant  fraction 0 %. Tricuspid regurgitant volume 0 ml.      THORACIC AORTA  The thoracic aorta appears normal in course, caliber, and contour.  There is no evidence for acute aortic pathology. The arch vessel  branching pattern is  normal.   All the arch branch vessels appear  widely patent in their proximal portions.      PULMONARY ARTERIES  The central pulmonary arteries appear  normal (MPA-2.7 cm, RPA-2.2  cm, LPA-1.7 cm).      SYSTEMIC AND PULMONARY VEINS  Normal systemic venous and pulmonary venous return.  The SVC and IVC are of normal caliber.  Normal pulmonary venous anatomy.      CHEST  The chest wall is normal.  No  significant lymphadenopathy or mass is seen in limited images of  the mediastinum. Limited imaging through the lungs reveals no gross  abnormalities. No pleural effusion.      UPPER ABDOMEN  Limited imaging through the upper abdomen reveals no abnormalities of  the visualized organs.      IMPRESSION:  1. Hypertrophic cardiomyopathy with mid to apical septal  predominance. Maximal wall thickness of 2.6 cm. The apex is slightly  aneurysmal.  2. There is subepicardial and midwall late gadolinium enhancement at  the level of the basal anteroseptal, anterior and apical inferior  walls. Estimated scar burden of 8%.    Review of HCM scar burden was assessed at greater than 15%    Lab review: I have Chemistry CMP:   Lab Results   Component Value Date    ALBUMIN 4.4 12/04/2023    CALCIUM 9.4 12/04/2023    CO2 26 12/04/2023    CREATININE 0.79 12/04/2023    GLUCOSE 80 12/04/2023    BILITOT 0.8 12/04/2023    PROT 6.4 12/04/2023    ALT 35 12/04/2023    AST 29 12/04/2023    ALKPHOS 76 12/04/2023   , Chemistry BMP   Lab Results   Component Value Date    GLUCOSE 80 12/04/2023    CALCIUM 9.4 12/04/2023    CO2 26 12/04/2023    CREATININE 0.79 12/04/2023   , and CBC:  Lab Results   Component Value Date    WBC 13.6 (H) 11/30/2022    RBC 5.09 11/30/2022    HGB 16.1 11/30/2022    HCT 48.6 11/30/2022    MCV 95 11/30/2022    MCHC 33.1 11/30/2022    RDW 12.8 11/30/2022     Assessment/Plan   # Non - obstructive HCM with non - sustained VT on ambulatory monitoring and LGE > 15% on cardiac MRI    Other medical conditions: LAD aneurysm, HTN, HLD, tobacco abuse    His risk of sudden death over a 5 year period is around 5%. According to the guidelines, this warrants an ICD for primary prevention ( class 2b recommendation). The presence of extensive scar likely increases this risk.     We had a detailed discussion on the risks, benefits, and rationale of both the traditional transvenous ICD and the subcutaneous ICD. Given the physical nature of  his job and young age, he was more in favor of the subcutaneous ICD. We did a subQ ICD screen test in the office and he passed in all vectors in supine / sitting /standing/  left lateral position.     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     We will discuss our recommendations with his primary cardiologist Dr. Oropeza and will arrange for the procedure tentative date June 24, 2024.         The [place] SDM tool was used for shared decision making during this clinic visit. The potential benefits and risks of the procedure were reviewed with the patient based on the best available evidence. Decisions that were made took into account the patient's health goals, preferences, and values. All the patient's questions were addressed.    Dustin Lucero MD  Electrophysiology Fellow.     Marcella Campos MD

## 2024-05-29 NOTE — H&P (VIEW-ONLY)
Referred by Saúl Lockett MD  for  ICD evaluation   Chief Complaint   Patient presents with    Cardiomyopathy         History Of Present Illness:    Festus Negrete Jr. is a 54 y.o. year old male patient with hypertropic obstructive cardiomyopathy being referred for ICD evaluation.    He initial presentation was for suspicion of acute coronary syndrome. He had a coronary angiogram which showed a 5 to 6 mm aneurysm in the mid LAD but otherwise his coronary arteries were normal. He had a coronary angiogram which showed a 5 to 6 mm aneurysm in the mid LAD but otherwise his coronary arteries were normal.     He was eventually discussed at the HCM board meeting. Given LGE > 15% and NSVT on monitor, he was recommended a primary prevention ICD. 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 He was thus referred to  for further evaluation.     On interview:    - No clear history of arrhythmic syncope, though notes exertional pre-syncope    - No clear family history of sudden death in a member known to have HCM. But unexplained deaths in paternal grandmother, paternal cousins many of whom were less than 55 years of age    - No palpitations, no chest pain / shortness of breath    - No orthopnea / paroxysmal nocturnal dyspnea     Past Medical History:  Past Medical History:   Diagnosis Date    Chest pain, atypical 09/10/2023    Chronic stable angina (CMS-HCC) 10/20/2023    Contusion of rib on right side 09/10/2023    Coronary artery aneurysm 05/31/2023    Dr. He    Deviated nasal septum 09/10/2023    Diverticulitis of colon with perforation 06/25/2023    Hyperlipidemia 05/31/2023    Hypertrophic cardiomyopathy (Multi) 09/10/2023    Hypertrophy of inferior nasal turbinate 09/10/2023    Moderate persistent asthma, uncomplicated ([facility]-HCC) 02/25/2020    Moderate persistent asthma    NSTEMI (non-ST elevated myocardial infarction) (Multi) 05/31/2023    Tobacco abuse  "05/31/2023       Past Surgical History:  Past Surgical History:   Procedure Laterality Date    OTHER SURGICAL HISTORY  03/25/2020    Cardiac catheterization    OTHER SURGICAL HISTORY  03/25/2020    Foot surgery         Social History:  Caffeine: Pepsi every day , up to 5 bottles of 500 ml each   Cigarettes: Up to 2 packs / day  ETOH: Every day social drinker  Drugs: None  Exercise: No formal exercise regimen   Occ:    Marital status: , lives with wife    Family History:  Family History   Problem Relation Name Age of Onset    Breast cancer Mother      Bone cancer Mother      Heart attack Father      Coronary artery disease Father      Other (healthy adult) Father      Schizophrenia Sister      Bipolar disorder Sister           Allergies:  Allergies   Allergen Reactions    Tree Nuts Shortness of breath    Egg Unknown    Morphine Other and Hives        Outpatient Medications:  Current Outpatient Medications   Medication Instructions    albuterol 90 mcg/actuation inhaler 2 puffs, inhalation, Every 4 hours PRN    aspirin 81 mg EC tablet 1 tablet, oral, Daily    atorvastatin (LIPITOR) 40 mg, oral, Nightly    fluticasone (Flonase Allergy Relief) 50 mcg/actuation nasal spray 2 sprays, Each Nostril, Daily    fluticasone-umeclidin-vilanter (Trelegy Ellipta) 200-62.5-25 mcg blister with device 1 puff, inhalation, Daily    nitroglycerin (NITROSTAT) 0.4 mg, sublingual, Every 5 min PRN, May repeat dose every 5 minutes for up to 3 doses total.    verapamil SR (CALAN-SR) 120 mg, oral, Nightly, Do not crush or chew.         Last Recorded Vitals:      12/6/2023     2:47 PM 1/30/2024    10:13 AM 1/30/2024    10:18 AM 2/8/2024     3:15 PM 2/8/2024     3:18 PM 3/28/2024     8:13 AM 4/22/2024     8:58 AM   Vitals   Systolic 118 118 115 119 111 120 121   Diastolic 66 79 79 75 72 73 84   Heart Rate 67 62  77 78 67 68   Resp       20   Height (in) 1.854 m (6' 1\")   1.854 m (6' 1\")  1.854 m (6' 1\") 1.854 m (6' 1\")   Weight (lb) " 192.6 196  193.06  203 197   BMI 25.41 kg/m2 25.86 kg/m2  25.47 kg/m2  26.78 kg/m2 25.99 kg/m2   BSA (m2) 2.12 m2 2.14 m2  2.12 m2  2.18 m2 2.15 m2   Visit Report Report Report Report Report Report Report Report        Physical Exam:  Physical Exam  Vitals and nursing note reviewed.   Constitutional:       Appearance: Normal appearance.   HENT:      Head: Normocephalic and atraumatic.   Cardiovascular:      Rate and Rhythm: Regular rhythm.   Pulmonary:      Effort: Pulmonary effort is normal.   Abdominal:      Palpations: Abdomen is soft.   Skin:     General: Skin is warm.      Capillary Refill: Capillary refill takes less than 2 seconds.   Neurological:      General: No focal deficit present.      Mental Status: He is alert.   Psychiatric:         Mood and Affect: Mood normal.           Last Cardiology Tests:  ECG:  ECG 12 lead (Clinic Performed) 01/30/2024  NSR + ST - T changes on inferior leads + ventricular rate 65 bpm + QRSd 90 ms + Qtc 420 ms + R wave axis normal    Echo:  Echocardiogram Stress Test 02/08/2024  Summary:   1. Submaximal level of stress achieved.   2. The resting ejection fraction was estimated at 60 to 65% with a peak exercise ejection fraction estimated at 70 to 75%.   3. Normal global left ventricular systolic function.   4. Resting echocardiogram showed IVSd 2.6 cm and no systolic anterior motion of the mitral valve or appreciable LVOT gradient.   5. At peak exercise there is no LYNSEY or LVOT obstruction (gradient <10 mmHg). There is mild mitral regurgitation.   6. The study is non diagnostic for ischemia due to reaching only 77% of age predicted maximal heart rate and focus on LVOT obstruction evaluation. However, there was no ECG, clinical or echocardiographic suggestion of ischemia at the submaximal level of exertion.   7. Dr. Oropeza in room during exercise portion.    Transthoracic Echo (TTE) Complete 10/13/2023  CONCLUSIONS:   1. Left ventricular systolic function is normal with a 60%  estimated ejection fraction.   2. Spectral Doppler shows an impaired relaxation pattern of left ventricular diastolic filling.   3. The left atrium is moderately dilated.   4. Moderate mitral valve regurgitation.   5. Slightly elevated RVSP.   6. There is severe asymmetric septal hypertrophy involving the proximal to mid septum.   7. There is not left ventricular outflow tract obstruction.   8. Definity study confirms above findings with primarily mid septal hypertrophy.      Cath:  Catheterization:  -3/16/2020: normal LM, large LAD aneurysm 5-6 mm, angiographically normal other vessels.    Holter Monitor 2/8/2024:          Cardiac Imaging:  MR cardiac morphology and function w and wo IV contrast 11/30/2023      MR CARDIAC MORPHOLOGY AND FUNCTION W AND WO IV CONTRAST;  11/30/2023  8:56 am      INDICATION:  Signs/Symptoms:Hypertrophic cardiomyopathy proximal to mid septum.  This study is performed to assess myocardial viability and damage,  and to quantitate left ventricular and valvular function.      COMPARISON:  None.      ACCESSION NUMBER(S):  [id number]      ORDERING CLINICIAN:  BEN VARELA      TECHNIQUE:  Siemens1.5  Sade MRI scanner.  Turbo spin echo and balanced steady state free precession (bSSFP)  imaging for anatomic definition. Dynamic cine bSSFP for cardiac  chamber and wall-motion analysis, and valvular analysis. Flow  quantification sequences for hemodynamics. Delayed gadolinium  enhancement analysis after injection of gadolinium-chelate (mL of  Dotarem, 0.2 mmol/kg).      HT- ; WT-; BSA-  m2      FINDINGS:  CARDIAC CHAMBERS  Normal atrioventricular and ventriculoarterial concordance      LEFT ATRIUM  Normal size      RIGHT ATRIUM  Normal size      INTERATRIAL SEPTUM  Intact.      LEFT VENTRICLE  There is severe LV hypertrophy. Maximal wall thickness of 2.6 cm at  the level of the mid infero septal/anteroseptal and apical septal  walls. The apex is slightly aneurysmal. No evidence of [place]  or  increase LV outflow tract.  There is asymmetric LVH. Quantitative  LVEF 57 %. LV cavity size is normal. LV systolic function is normal.  There is no LV mass/thrombus.      T2 star myocardium: 30 milliseconds  T2 star liver 20 milliseconds      T1 myocardium: 1052 milliseconds  T1 blood pool: 1547 milliseconds  T1 postcontrast myocardial: 493 milliseconds  T1 blood pool postcontrast: 390 milliseconds  ECV: 29%      There is subepicardial mid wall late gadolinium enhancement at the  level of the basal anteroseptal and anterior wall as well as the  apical inferior wall. Estimated scar burden of 8%.      RIGHT VENTRICLE  RV wall thickness is normal. RV cavity size is normal. RV systolic  function is normal. There is no RV mass/thrombus. There is no RV  fibro-fatty infiltration. Quantitative RVEF 62 %.      INTERVENTRICULAR SEPTUM  Intact.      AORTIC VALVE  Peak aortic valve velocity 169 cm/sec. Aortic regurgitant volume 0  ml. Aortic regurgitant fraction 1 %. Aortic valve is trileaflet.      MITRAL VALVE  Mitral regurgitant volume 1 ml. Peak mitral valve velocity 45 cm/sec.  Mitral regurgitant fraction 1 %. Mitral valve leaflets are normal.  Mitral valve is mildly thickened. There is mild mitral regurgitation.      TRICUSPID VALVE  Peak tricuspid valve velocity 149 cm/sec. Tricuspid regurgitant  fraction 0 %. Tricuspid regurgitant volume 0 ml.      THORACIC AORTA  The thoracic aorta appears normal in course, caliber, and contour.  There is no evidence for acute aortic pathology. The arch vessel  branching pattern is  normal.   All the arch branch vessels appear  widely patent in their proximal portions.      PULMONARY ARTERIES  The central pulmonary arteries appear  normal (MPA-2.7 cm, RPA-2.2  cm, LPA-1.7 cm).      SYSTEMIC AND PULMONARY VEINS  Normal systemic venous and pulmonary venous return.  The SVC and IVC are of normal caliber.  Normal pulmonary venous anatomy.      CHEST  The chest wall is normal.  No  significant lymphadenopathy or mass is seen in limited images of  the mediastinum. Limited imaging through the lungs reveals no gross  abnormalities. No pleural effusion.      UPPER ABDOMEN  Limited imaging through the upper abdomen reveals no abnormalities of  the visualized organs.      IMPRESSION:  1. Hypertrophic cardiomyopathy with mid to apical septal  predominance. Maximal wall thickness of 2.6 cm. The apex is slightly  aneurysmal.  2. There is subepicardial and midwall late gadolinium enhancement at  the level of the basal anteroseptal, anterior and apical inferior  walls. Estimated scar burden of 8%.    Review of HCM scar burden was assessed at greater than 15%    Lab review: I have Chemistry CMP:   Lab Results   Component Value Date    ALBUMIN 4.4 12/04/2023    CALCIUM 9.4 12/04/2023    CO2 26 12/04/2023    CREATININE 0.79 12/04/2023    GLUCOSE 80 12/04/2023    BILITOT 0.8 12/04/2023    PROT 6.4 12/04/2023    ALT 35 12/04/2023    AST 29 12/04/2023    ALKPHOS 76 12/04/2023   , Chemistry BMP   Lab Results   Component Value Date    GLUCOSE 80 12/04/2023    CALCIUM 9.4 12/04/2023    CO2 26 12/04/2023    CREATININE 0.79 12/04/2023   , and CBC:  Lab Results   Component Value Date    WBC 13.6 (H) 11/30/2022    RBC 5.09 11/30/2022    HGB 16.1 11/30/2022    HCT 48.6 11/30/2022    MCV 95 11/30/2022    MCHC 33.1 11/30/2022    RDW 12.8 11/30/2022     Assessment/Plan   # Non - obstructive HCM with non - sustained VT on ambulatory monitoring and LGE > 15% on cardiac MRI    Other medical conditions: LAD aneurysm, HTN, HLD, tobacco abuse    His risk of sudden death over a 5 year period is around 5%. According to the guidelines, this warrants an ICD for primary prevention ( class 2b recommendation). The presence of extensive scar likely increases this risk.     We had a detailed discussion on the risks, benefits, and rationale of both the traditional transvenous ICD and the subcutaneous ICD. Given the physical nature of  his job and young age, he was more in favor of the subcutaneous ICD. We did a subQ ICD screen test in the office and he passed in all vectors in supine / sitting /standing/  left lateral position.     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     We will discuss our recommendations with his primary cardiologist Dr. Oropeza and will arrange for the procedure tentative date June 24, 2024.         The [place] SDM tool was used for shared decision making during this clinic visit. The potential benefits and risks of the procedure were reviewed with the patient based on the best available evidence. Decisions that were made took into account the patient's health goals, preferences, and values. All the patient's questions were addressed.    Dustin Lucero MD  Electrophysiology Fellow.     Marcella Campos MD

## 2024-05-29 NOTE — PATIENT INSTRUCTIONS
It was nice to meet you today!    You have a diagnosis of hypertrophic cardiomyopathy. Hypertrophic cardiomyopathy is a condition that results in an abnormal thickening of the heart muscle.      In some cases the thickness can block the flow of blood out of the heart and heart murmurs, shortness of breath and decreased exercise stamina can result. Although initially it is very strong, over time it can weaken and heart failure can develop.  Scarring or fibrosis is often seen in the heart muscle and its presence will increase the risk of developing life-threatening heart rhythms called ventricular tachycardia.      Your cardiac MRI showed extensive scar tissue formation and we did see short episodes of rhythms from the bottom chamber of the heart called ventricular tachycardia ( not sustained) on the monitor.    Therefore to protect you from life-threatening rhythms Dr Campos is recommending an implantable cardioverter defibrillator.     Please refer to your decision aid for ICDs.      The ICD is implanted in the left chest while you are under conscious sedation.  The procedure will take about 2 hours and you may be admitted overnight for observation.    The risks associated with the implant include pneumothorax , perforation, bleeding or infection. These risks are considered low, 1/500-1000.  In the future, the device may deliver a shock to treat a heart rhythm and this may cause discomfort but in most cases is considered life-saving.  In some instances the shock may be delivered inappropriately for a non-life threatening heart rhythm and this would feel the same way.    Your procedure is scheduled at [place] [address].  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  In  addition you will be asked to refrain from lifting your left arm over your head for 4 weeks, which means no golf or other sporting activity.    Please have your blood drawn the week before the procedure at any  lab facility.  Do not eat after midnight the night before the procedure.  You will be called on the Friday before with a time to report to Patient Registration the morning of your procedure.

## 2024-06-04 ENCOUNTER — APPOINTMENT (OUTPATIENT)
Dept: CARDIOLOGY | Facility: CLINIC | Age: 54
End: 2024-06-04
Payer: COMMERCIAL

## 2024-06-07 LAB
ATRIAL RATE: 63 BPM
P AXIS: 59 DEGREES
P OFFSET: 206 MS
P ONSET: 142 MS
PR INTERVAL: 146 MS
Q ONSET: 215 MS
QRS COUNT: 10 BEATS
QRS DURATION: 90 MS
QT INTERVAL: 414 MS
QTC CALCULATION(BAZETT): 423 MS
QTC FREDERICIA: 420 MS
R AXIS: 68 DEGREES
T AXIS: -53 DEGREES
T OFFSET: 422 MS
VENTRICULAR RATE: 63 BPM

## 2024-06-12 ENCOUNTER — TELEPHONE (OUTPATIENT)
Dept: CARDIOLOGY | Facility: CLINIC | Age: 54
End: 2024-06-12
Payer: COMMERCIAL

## 2024-06-12 DIAGNOSIS — I42.2 HYPERTROPHIC CARDIOMYOPATHY (MULTI): ICD-10-CM

## 2024-06-12 NOTE — TELEPHONE ENCOUNTER
Spoke with Mr. & Mrs. Negrete to review pre-procedural instruction for upcoming sub cutaneous defibrillator implant on Monday, June 24, 2024 with Dr. Marcella Campos.      DO NOT eat or drink anything (including medications) after midnight on Sunday, June 23rd.   Please bring all of your pill bottles or an updated medication list with you to the hospital.     Please have routine blood work drawn as soon as possible. Requisitions have been placed in your electronic record for your convenience.    You may be staying in the hospital overnight so please make appropriate arrangements (pjs, toiletries and transportation home).    The current visitation policy is as follows:   Two visitors at a time, over the age of eighteen will be allowed in the hospital. The visitor(s) shall remain in the patient area/room throughout the visitation period, except for brief periods of respite (e.g. food delivery/cafeteria). Visitors are expected to perform thorough hand washing before re-entering the patient areas.  Face masks are optional at this time.  The morning of the procedure, report to the Admitting Department on the second floor of [facility]   at [facility], [address] [place] [address].    The electrophysiology schedulers will call you the Friday before your scheduled procedure to let you know what time to arrive.   If you do not hear from the schedulers by 2:00 pm on Friday, June 21st please call them at    [phone].     If you have any other questions or concerns regarding this procedure please call me at [phone] or Dr. Campos's office at [phone].

## 2024-06-17 ENCOUNTER — LAB (OUTPATIENT)
Dept: LAB | Facility: LAB | Age: 54
End: 2024-06-17
Payer: COMMERCIAL

## 2024-06-17 DIAGNOSIS — I42.2 HYPERTROPHIC CARDIOMYOPATHY (MULTI): ICD-10-CM

## 2024-06-17 PROCEDURE — 85027 COMPLETE CBC AUTOMATED: CPT

## 2024-06-17 PROCEDURE — 36415 COLL VENOUS BLD VENIPUNCTURE: CPT

## 2024-06-17 PROCEDURE — 80053 COMPREHEN METABOLIC PANEL: CPT

## 2024-06-18 LAB
ALBUMIN SERPL BCP-MCNC: 4.1 G/DL (ref 3.4–5)
ALP SERPL-CCNC: 77 U/L (ref 33–120)
ALT SERPL W P-5'-P-CCNC: 26 U/L (ref 10–52)
ANION GAP SERPL CALC-SCNC: 12 MMOL/L (ref 10–20)
AST SERPL W P-5'-P-CCNC: 24 U/L (ref 9–39)
BILIRUB SERPL-MCNC: 0.7 MG/DL (ref 0–1.2)
BUN SERPL-MCNC: 9 MG/DL (ref 6–23)
CALCIUM SERPL-MCNC: 9.1 MG/DL (ref 8.6–10.6)
CHLORIDE SERPL-SCNC: 108 MMOL/L (ref 98–107)
CO2 SERPL-SCNC: 25 MMOL/L (ref 21–32)
CREAT SERPL-MCNC: 0.71 MG/DL (ref 0.5–1.3)
EGFRCR SERPLBLD CKD-EPI 2021: >90 ML/MIN/1.73M*2
ERYTHROCYTE [DISTWIDTH] IN BLOOD BY AUTOMATED COUNT: 14.6 % (ref 11.5–14.5)
GLUCOSE SERPL-MCNC: 107 MG/DL (ref 74–99)
HCT VFR BLD AUTO: 43.7 % (ref 41–52)
HGB BLD-MCNC: 14.4 G/DL (ref 13.5–17.5)
MCH RBC QN AUTO: 31.9 PG (ref 26–34)
MCHC RBC AUTO-ENTMCNC: 33 G/DL (ref 32–36)
MCV RBC AUTO: 97 FL (ref 80–100)
NRBC BLD-RTO: 0 /100 WBCS (ref 0–0)
PLATELET # BLD AUTO: 126 X10*3/UL (ref 150–450)
POTASSIUM SERPL-SCNC: 3.8 MMOL/L (ref 3.5–5.3)
PROT SERPL-MCNC: 5.9 G/DL (ref 6.4–8.2)
RBC # BLD AUTO: 4.52 X10*6/UL (ref 4.5–5.9)
SODIUM SERPL-SCNC: 141 MMOL/L (ref 136–145)
WBC # BLD AUTO: 10.8 X10*3/UL (ref 4.4–11.3)

## 2024-06-23 ENCOUNTER — ANESTHESIA EVENT (OUTPATIENT)
Dept: CARDIOLOGY | Facility: HOSPITAL | Age: 54
End: 2024-06-23
Payer: COMMERCIAL

## 2024-06-24 ENCOUNTER — HOSPITAL ENCOUNTER (OUTPATIENT)
Facility: HOSPITAL | Age: 54
Setting detail: OUTPATIENT SURGERY
Discharge: HOME | End: 2024-06-24
Attending: INTERNAL MEDICINE | Admitting: INTERNAL MEDICINE
Payer: COMMERCIAL

## 2024-06-24 ENCOUNTER — ANESTHESIA (OUTPATIENT)
Dept: CARDIOLOGY | Facility: HOSPITAL | Age: 54
End: 2024-06-24
Payer: COMMERCIAL

## 2024-06-24 ENCOUNTER — HOSPITAL ENCOUNTER (OUTPATIENT)
Dept: CARDIOLOGY | Facility: HOSPITAL | Age: 54
Discharge: HOME | End: 2024-06-24
Payer: COMMERCIAL

## 2024-06-24 VITALS — WEIGHT: 193 LBS | BODY MASS INDEX: 25.46 KG/M2

## 2024-06-24 DIAGNOSIS — I42.2 OTHER HYPERTROPHIC CARDIOMYOPATHY (MULTI): ICD-10-CM

## 2024-06-24 DIAGNOSIS — Z95.810 CARDIAC DEFIBRILLATOR IN PLACE: ICD-10-CM

## 2024-06-24 PROCEDURE — 2500000004 HC RX 250 GENERAL PHARMACY W/ HCPCS (ALT 636 FOR OP/ED): Performed by: INTERNAL MEDICINE

## 2024-06-24 PROCEDURE — 2500000004 HC RX 250 GENERAL PHARMACY W/ HCPCS (ALT 636 FOR OP/ED): Mod: JZ | Performed by: ANESTHESIOLOGIST ASSISTANT

## 2024-06-24 PROCEDURE — C1889 IMPLANT/INSERT DEVICE, NOC: HCPCS | Performed by: INTERNAL MEDICINE

## 2024-06-24 PROCEDURE — 33270 INS/REP SUBQ DEFIBRILLATOR: CPT | Performed by: INTERNAL MEDICINE

## 2024-06-24 PROCEDURE — 7100000009 HC PHASE TWO TIME - INITIAL BASE CHARGE: Performed by: INTERNAL MEDICINE

## 2024-06-24 PROCEDURE — 2720000007 HC OR 272 NO HCPCS: Performed by: INTERNAL MEDICINE

## 2024-06-24 PROCEDURE — 3700000001 HC GENERAL ANESTHESIA TIME - INITIAL BASE CHARGE: Performed by: INTERNAL MEDICINE

## 2024-06-24 PROCEDURE — C1722 AICD, SINGLE CHAMBER: HCPCS | Performed by: INTERNAL MEDICINE

## 2024-06-24 PROCEDURE — 2750000001 HC OR 275 NO HCPCS: Performed by: INTERNAL MEDICINE

## 2024-06-24 PROCEDURE — C1781 MESH (IMPLANTABLE): HCPCS | Performed by: INTERNAL MEDICINE

## 2024-06-24 PROCEDURE — 3700000002 HC GENERAL ANESTHESIA TIME - EACH INCREMENTAL 1 MINUTE: Performed by: INTERNAL MEDICINE

## 2024-06-24 PROCEDURE — 7100000010 HC PHASE TWO TIME - EACH INCREMENTAL 1 MINUTE: Performed by: INTERNAL MEDICINE

## 2024-06-24 PROCEDURE — C1894 INTRO/SHEATH, NON-LASER: HCPCS | Performed by: INTERNAL MEDICINE

## 2024-06-24 PROCEDURE — 2780000003 HC OR 278 NO HCPCS: Performed by: INTERNAL MEDICINE

## 2024-06-24 PROCEDURE — P9045 ALBUMIN (HUMAN), 5%, 250 ML: HCPCS | Mod: JZ | Performed by: ANESTHESIOLOGIST ASSISTANT

## 2024-06-24 DEVICE — ELECTRODE DELIVERY SYSTEM
Type: IMPLANTABLE DEVICE | Site: CHEST | Status: FUNCTIONAL
Brand: EMBLEM™ S-ICD ELECTRODE DELIVERY SYSTEM

## 2024-06-24 DEVICE — SUBCUTANEOUS ELECTRODE
Type: IMPLANTABLE DEVICE | Site: CHEST | Status: FUNCTIONAL
Brand: EMBLEM™ S-ICD

## 2024-06-24 DEVICE — SUBCUTANEOUS IMPLANTABLE CARDIOVERTER DEFIBRILLATOR
Type: IMPLANTABLE DEVICE | Site: CHEST  WALL | Status: FUNCTIONAL
Brand: EMBLEM™ MRI S-ICD

## 2024-06-24 RX ORDER — BUPIVACAINE HYDROCHLORIDE 2.5 MG/ML
INJECTION, SOLUTION EPIDURAL; INFILTRATION; INTRACAUDAL AS NEEDED
Status: DISCONTINUED | OUTPATIENT
Start: 2024-06-24 | End: 2024-06-24 | Stop reason: HOSPADM

## 2024-06-24 RX ORDER — ACETAMINOPHEN 10 MG/ML
INJECTION, SOLUTION INTRAVENOUS AS NEEDED
Status: DISCONTINUED | OUTPATIENT
Start: 2024-06-24 | End: 2024-06-24

## 2024-06-24 RX ORDER — VANCOMYCIN HYDROCHLORIDE 1 G/20ML
INJECTION, POWDER, LYOPHILIZED, FOR SOLUTION INTRAVENOUS AS NEEDED
Status: DISCONTINUED | OUTPATIENT
Start: 2024-06-24 | End: 2024-06-24

## 2024-06-24 RX ORDER — MIDAZOLAM HYDROCHLORIDE 1 MG/ML
INJECTION INTRAMUSCULAR; INTRAVENOUS AS NEEDED
Status: DISCONTINUED | OUTPATIENT
Start: 2024-06-24 | End: 2024-06-24

## 2024-06-24 RX ORDER — DEXMEDETOMIDINE HYDROCHLORIDE 4 UG/ML
INJECTION, SOLUTION INTRAVENOUS CONTINUOUS PRN
Status: DISCONTINUED | OUTPATIENT
Start: 2024-06-24 | End: 2024-06-24

## 2024-06-24 RX ORDER — ALBUMIN HUMAN 50 G/1000ML
SOLUTION INTRAVENOUS AS NEEDED
Status: DISCONTINUED | OUTPATIENT
Start: 2024-06-24 | End: 2024-06-24

## 2024-06-24 RX ORDER — FENTANYL CITRATE 50 UG/ML
INJECTION, SOLUTION INTRAMUSCULAR; INTRAVENOUS AS NEEDED
Status: DISCONTINUED | OUTPATIENT
Start: 2024-06-24 | End: 2024-06-24

## 2024-06-24 RX ORDER — CEFAZOLIN 1 G/1
INJECTION, POWDER, FOR SOLUTION INTRAVENOUS AS NEEDED
Status: DISCONTINUED | OUTPATIENT
Start: 2024-06-24 | End: 2024-06-24

## 2024-06-24 SDOH — HEALTH STABILITY: MENTAL HEALTH: CURRENT SMOKER: 1

## 2024-06-24 ASSESSMENT — PAIN SCALES - GENERAL: PAINLEVEL_OUTOF10: 0 - NO PAIN

## 2024-06-24 ASSESSMENT — PAIN - FUNCTIONAL ASSESSMENT: PAIN_FUNCTIONAL_ASSESSMENT: 0-10

## 2024-06-24 NOTE — ANESTHESIA PROCEDURE NOTES
Peripheral Block    Start time: 6/24/2024 8:20 AM  End time: 6/24/2024 8:30 AM  Reason for block: at surgeon's request and post-op pain management  Staffing  Performed: attending   Authorized by: Maximiliano Buckner MD    Performed by: CLAIRE Beach  Preanesthetic Checklist  Completed: patient identified, IV checked, site marked, risks and benefits discussed, surgical consent, monitors and equipment checked, pre-op evaluation and timeout performed   Timeout performed at: 6/24/2024 8:20 AM  Peripheral Block  Patient position: right lateral decubitus  Prep: ChloraPrep  Patient monitoring: heart rate, cardiac monitor and continuous pulse ox  Block type: serratus anterior  Laterality: left  Injection technique: single-shot  Guidance: ultrasound guided  Infiltration strength: 0.5 %  Dose: 30 mL  Needle  Needle type: pencil-tip   Needle length: 8 cm  Needle localization: ultrasound guidance     image stored in chart  Assessment  Injection assessment: negative aspiration for heme, no paresthesia on injection, incremental injection and local visualized surrounding nerve on ultrasound  Heart rate change: no  Slow fractionated injection: yes  Additional Notes  0.5% Ropivacaine 30 ml, 4 mg decadron, 150 mcg of epinephrine injected to SAP plane at posterior axillary line above the 5th rib under US guidance without complications.

## 2024-06-24 NOTE — DISCHARGE INSTRUCTIONS
Discharge Instructions for your Cardiac Device   CARDIAC DEVICE CLINIC  [phone]              Incision:   1.  Keep your incision clean and dry for 1 week.  2. May shower 7 days after the procedure. Do not submerge the incision in a tub, pool, hot tub, or lake for 4 weeks.  3. Your incision should look better each day. If you notice unusual swelling, redness, drainage or fever greater than 100 degrees, please call the Device Clinic or your Doctor's office.  4. Avoid using deodorants, powders, creams, lotions, etc on your incision for 4 weeks.   5. There are no stitches to be removed.  If you received a “glue” closure this may appear purple-gray and does not get removed but wears away slowly on its own.  Steri-strips (small white bandages) may be removed in one week or they may fall off on their own earlier.  Pain:  1. It is normal for the area around the incision to be tender for a few weeks following surgery. Pain relievers such as Tylenol or Motrin (whichever you can take) are usually sufficient for pain relief. If the pain gets worse instead of better than please call the Device Clinic or your Doctor.  Activity:  1. Do no  anything weighing more than 15 pounds.   2. Avoid exercising with the arm on the side of your pacemaker.  So no golf, swimming, tennis, bowling etc for 4 weeks.    3. Driving: If you were driving prior to your procedure, you may resume driving in 1 week. If you experienced a loss of consciousness prior to your procedure, you should verify with your Doctor when you are able to resume driving.  ID CARD:  1. It is important to carry your device ID card with you at all times.   2. Inform doctors and health care providers that you have a pacemaker or defibrillator.  Electromagnetic Interference:  1. Microwave ovens are safe to use.  2. Cellular phones should be held to the opposite ear from your device. Do not carry your phone in your shirt pocket. Some i-phones that self-charge can  interfere with your device so be sure to keep it away from your pacemaker/defibrillator.   3. Read the Patient Booklet for more information. You may call either the Device Clinic [phone]  or the patient services of the device  with questions about specific electrical appliances and interference problems.    IT IS YOUR RESPONSIBILITY TO MAKE AND KEEP APPOINTMENTS.   Please refer to your Device clinic handout.

## 2024-06-24 NOTE — ANESTHESIA PROCEDURE NOTES
Peripheral IV  Date/Time: 6/24/2024 8:15 AM      Placement  Needle size: 18 G  Laterality: right  Location: hand  Site prep: alcohol

## 2024-06-24 NOTE — ANESTHESIA PREPROCEDURE EVALUATION
Patient: Festus Negrete Jr.    Procedure Information       Date/Time: 06/24/24 0730    Procedure: ICD SUBQ Implant - General MAC  Anesthesiologist:  Dr. Buckner  Vendor:  [employer]  1st case!    Location: [facility] Cardiac Cath Lab    Providers: Marcella Campos MD            Relevant Problems   Cardiac   (+) Benign essential hypertension   (+) Chest pain   (+) Chronic stable angina (CMS-HCC)   (+) Hyperlipidemia   (+) Moderate mitral regurgitation   (+) NSTEMI (non-ST elevated myocardial infarction) (Multi)      Pulmonary   (+) RUBY (dyspnea on exertion)   (+) Moderate persistent asthma (HHS-HCC)      Neuro   (+) Cervical radiculopathy, acute       Last Cardiology Tests:  ECG:  ECG 12 lead (Clinic Performed) 01/30/2024  NSR + ST - T changes on inferior leads + ventricular rate 65 bpm + QRSd 90 ms + Qtc 420 ms + R wave axis normal     Echo:  Echocardiogram Stress Test 02/08/2024  Summary:   1. Submaximal level of stress achieved.   2. The resting ejection fraction was estimated at 60 to 65% with a peak exercise ejection fraction estimated at 70 to 75%.   3. Normal global left ventricular systolic function.   4. Resting echocardiogram showed IVSd 2.6 cm and no systolic anterior motion of the mitral valve or appreciable LVOT gradient.   5. At peak exercise there is no LYNSEY or LVOT obstruction (gradient <10 mmHg). There is mild mitral regurgitation.   6. The study is non diagnostic for ischemia due to reaching only 77% of age predicted maximal heart rate and focus on LVOT obstruction evaluation. However, there was no ECG, clinical or echocardiographic suggestion of ischemia at the submaximal level of exertion.   7. Dr. Oropeza in room during exercise portion.     Transthoracic Echo (TTE) Complete 10/13/2023  CONCLUSIONS:   1. Left ventricular systolic function is normal with a 60% estimated ejection fraction.   2. Spectral Doppler shows an impaired relaxation pattern of left ventricular  diastolic filling.   3. The left atrium is moderately dilated.   4. Moderate mitral valve regurgitation.   5. Slightly elevated RVSP.   6. There is severe asymmetric septal hypertrophy involving the proximal to mid septum.   7. There is not left ventricular outflow tract obstruction.   8. Definity study confirms above findings with primarily mid septal hypertrophy.        Cath:  Catheterization:  -3/16/2020: normal LM, large LAD aneurysm 5-6 mm, angiographically normal other vessels.     Holter Monitor 2/8/2024:    Physical Exam    Airway  Mallampati: III  TM distance: <3 FB  Neck ROM: full     Cardiovascular    Dental    Pulmonary    Abdominal            Anesthesia Plan    History of general anesthesia?: yes  History of complications of general anesthesia?: no    ASA 3     MAC   (Left SAP block for postop pain control discussed with the patient and consented by him.)  The patient is a current smoker.    intravenous induction   Anesthetic plan and risks discussed with patient.  Use of blood products discussed with patient who consented to blood products.    Plan discussed with attending and CRNA.

## 2024-06-24 NOTE — PROGRESS NOTES
Pharmacy Medication History Review    Festus Negrete Jr. is a 54 y.o. male admitted for No Principal Problem: There is no principal problem currently on the Problem List. Please update the Problem List and refresh.. Pharmacy reviewed the patient's uhbfj-ny-lhtcjbxoa medications and allergies for accuracy.    The list below reflects the updated PTA list. Comments regarding how patient may be taking medications differently can be found in the Admit Orders Activity  Prior to Admission Medications   Prescriptions Last Dose Informant   albuterol 90 mcg/actuation inhaler Past Week Self   Sig: Inhale 2 puffs every 4 hours if needed for wheezing or shortness of breath.   aspirin 81 mg EC tablet 6/23/2024 Self   Sig: Take 1 tablet (81 mg) by mouth once daily.   atorvastatin (Lipitor) 40 mg tablet 6/23/2024 Self   Sig: TAKE 1 TABLET (40 MG) BY MOUTH ONCE DAILY AT BEDTIME.   fluticasone (Flonase Allergy Relief) 50 mcg/actuation nasal spray Past Week Self   Sig: Administer 2 sprays into each nostril once daily.   fluticasone-umeclidin-vilanter (Trelegy Ellipta) 200-62.5-25 mcg blister with device 6/23/2024 Self   Sig: Inhale 1 puff once daily.   ibuprofen/pseudoephedrine HCl (ADVIL COLD AND SINUS ORAL) Past Week Self   Sig: Take 1 tablet by mouth once daily as needed.   nitroglycerin (Nitrostat) 0.4 mg SL tablet Past Month Self   Sig: Place 1 tablet (0.4 mg) under the tongue every 5 minutes if needed for chest pain. May repeat dose every 5 minutes for up to 3 doses total.   verapamil SR (Calan-SR) 120 mg ER tablet 6/23/2024 Self   Sig: Take 1 tablet (120 mg) by mouth once daily at bedtime. Do not crush or chew.      Facility-Administered Medications: None        The list below reflects the updated allergy list. Please review each documented allergy for additional clarification and justification.  Allergies  Reviewed by RT Tomas on 6/24/2024        Severity Reactions Comments    Tree Nuts High Shortness of breath      "Egg Not Specified Unknown     Morphine Not Specified Hives, Other Arm was \"burning\"            Patient declines M2B at discharge. Pharmacy has been updated to [place], [place].    Sources:    -patient interview  -outpatient pharmacy dispense history  -Care Everywhere medication lists  -OARRS    Below are additional concerns with the patient's PTA list: none    Agusto Mathis, Mnia  Transitions of Care Pharmacist  [facility]  Please reach out via Secure Chat for questions, or if no response call [facility] or [facility]   "

## 2024-06-24 NOTE — Clinical Note
The GENERATOR, SNTMNT S-ICD, MRI PULSE - [id number] device was inserted. The leads were placed into the connector and visually verified to be in correct position.

## 2024-06-24 NOTE — ANESTHESIA POSTPROCEDURE EVALUATION
Patient: Festus Negrete Jr.    Procedure Summary       Date: 06/24/24 Room / Location: [facility] BIPLANE / Virtual [facility] MAT 3529 Cardiac Cath Lab    Anesthesia Start: 0742 Anesthesia Stop:     Procedure: ICD SUBQ Implant Diagnosis:       Other hypertrophic cardiomyopathy (Multi)      (Hypertrophic cardiomyopathy)    Providers: Marcella Campos MD Responsible Provider: Maximiliano Buckner MD    Anesthesia Type: MAC ASA Status: 3            Anesthesia Type: MAC    Vitals Value Taken Time   BP 94/66 06/24/24 1044   Temp 35.7 06/24/24 1044   Pulse 54 06/24/24 1044   Resp 15 06/24/24 1044   SpO2 95 06/24/24 1044       Anesthesia Post Evaluation    Patient location during evaluation: PACU  Patient participation: complete - patient participated  Level of consciousness: agitated  Pain management: adequate  Airway patency: patent  Cardiovascular status: acceptable  Respiratory status: acceptable  Hydration status: acceptable  Postoperative Nausea and Vomiting: none  Comments: Patient SV on RA.  VSS.  Report given to DEBI Johns PACU nurse        There were no known notable events for this encounter.

## 2024-06-24 NOTE — Clinical Note
Irrigated with vancomycin into pocket and xiphoid area. TYRX pouch inserted into pocket with device.

## 2024-06-24 NOTE — Clinical Note
Patient Clipped and Prepped: subxiphoid process. Prepped with Betadine and draped in sterile fashion.

## 2024-06-25 ENCOUNTER — HOSPITAL ENCOUNTER (OUTPATIENT)
Dept: CARDIOLOGY | Facility: HOSPITAL | Age: 54
Discharge: HOME | End: 2024-06-25
Payer: COMMERCIAL

## 2024-06-25 DIAGNOSIS — Z95.810 CARDIAC DEFIBRILLATOR IN PLACE: ICD-10-CM

## 2024-07-01 ENCOUNTER — HOSPITAL ENCOUNTER (OUTPATIENT)
Dept: CARDIOLOGY | Facility: CLINIC | Age: 54
Discharge: HOME | End: 2024-07-01
Payer: COMMERCIAL

## 2024-07-01 DIAGNOSIS — I42.2 HYPERTROPHIC CARDIOMYOPATHY (MULTI): ICD-10-CM

## 2024-07-19 ENCOUNTER — APPOINTMENT (OUTPATIENT)
Dept: PRIMARY CARE | Facility: CLINIC | Age: 54
End: 2024-07-19
Payer: COMMERCIAL

## 2024-07-19 VITALS
TEMPERATURE: 98.3 F | WEIGHT: 198 LBS | HEIGHT: 73 IN | OXYGEN SATURATION: 97 % | DIASTOLIC BLOOD PRESSURE: 84 MMHG | HEART RATE: 70 BPM | RESPIRATION RATE: 18 BRPM | BODY MASS INDEX: 26.24 KG/M2 | SYSTOLIC BLOOD PRESSURE: 136 MMHG

## 2024-07-19 DIAGNOSIS — Z72.0 TOBACCO ABUSE: ICD-10-CM

## 2024-07-19 DIAGNOSIS — J45.40 MODERATE PERSISTENT ASTHMA, UNSPECIFIED WHETHER COMPLICATED (HHS-HCC): ICD-10-CM

## 2024-07-19 DIAGNOSIS — Z00.00 HEALTHCARE MAINTENANCE: Primary | ICD-10-CM

## 2024-07-19 DIAGNOSIS — Z28.20 VACCINE REFUSED BY PATIENT: ICD-10-CM

## 2024-07-19 DIAGNOSIS — Z12.5 PROSTATE CANCER SCREENING: ICD-10-CM

## 2024-07-19 PROBLEM — C44.90 SKIN CANCER: Status: ACTIVE | Noted: 2024-07-19

## 2024-07-19 PROCEDURE — 4004F PT TOBACCO SCREEN RCVD TLK: CPT | Performed by: FAMILY MEDICINE

## 2024-07-19 PROCEDURE — 99396 PREV VISIT EST AGE 40-64: CPT | Performed by: FAMILY MEDICINE

## 2024-07-19 PROCEDURE — 3075F SYST BP GE 130 - 139MM HG: CPT | Performed by: FAMILY MEDICINE

## 2024-07-19 PROCEDURE — 3008F BODY MASS INDEX DOCD: CPT | Performed by: FAMILY MEDICINE

## 2024-07-19 PROCEDURE — 3079F DIAST BP 80-89 MM HG: CPT | Performed by: FAMILY MEDICINE

## 2024-07-19 RX ORDER — ALBUTEROL SULFATE 90 UG/1
2 AEROSOL, METERED RESPIRATORY (INHALATION) EVERY 4 HOURS PRN
Qty: 18 G | Refills: 3 | Status: SHIPPED | OUTPATIENT
Start: 2024-07-19

## 2024-07-19 RX ORDER — FLUTICASONE FUROATE, UMECLIDINIUM BROMIDE AND VILANTEROL TRIFENATATE 200; 62.5; 25 UG/1; UG/1; UG/1
1 POWDER RESPIRATORY (INHALATION) DAILY
Qty: 30 EACH | Refills: 11 | Status: SHIPPED | OUTPATIENT
Start: 2024-07-19 | End: 2025-07-19

## 2024-07-19 ASSESSMENT — PATIENT HEALTH QUESTIONNAIRE - PHQ9
1. LITTLE INTEREST OR PLEASURE IN DOING THINGS: NOT AT ALL
2. FEELING DOWN, DEPRESSED OR HOPELESS: NOT AT ALL
SUM OF ALL RESPONSES TO PHQ9 QUESTIONS 1 AND 2: 0

## 2024-07-19 NOTE — PATIENT INSTRUCTIONS
Today we performed your Annual Physical Exam.  Your preventative health care, labs and vaccinations have been reviewed and are up to date.      Shingrix recommended.     I refilled you medications for yoru copd    I  strongly encourage you to quit smoking. I have ordered your lung CT.      Follow up when results received.    Follow up in 6 months for a medication check      Follow up in 1 year for your Complete Physical Exam

## 2024-07-19 NOTE — PROGRESS NOTES
"Subjective   Patient ID: Festus Negrete Jr. is a 54 y.o. male who presents for Annual Exam.    Dentist and Eye Doctor appointments: UTD  Immunizations: defers shingrix  Diet: Meat & potatoes  Exercise: physical labor  Supplements: none  Sexually Active:yes  Cancer Screening:    Prostate:due   Colon: 2022 -cologuard; colonoscopy 2023   Testicular: home self exam   Skin:  sees derm; dr Parker    HPI     Review of Systems    Objective   /84   Pulse 70   Temp 36.8 °C (98.3 °F)   Resp 18   Ht 1.854 m (6' 1\")   Wt 89.8 kg (198 lb)   SpO2 97%   BMI 26.12 kg/m²     Physical Exam  Constitutional:       General: He is not in acute distress.     Appearance: He is well-developed. He is not diaphoretic.   HENT:      Head: Normocephalic and atraumatic.      Right Ear: Tympanic membrane normal.      Left Ear: Tympanic membrane normal.      Nose: Nose normal.      Mouth/Throat:      Mouth: Mucous membranes are moist.   Eyes:      General: No scleral icterus.     Pupils: Pupils are equal, round, and reactive to light.   Neck:      Thyroid: No thyromegaly.      Vascular: No JVD.   Cardiovascular:      Rate and Rhythm: Normal rate and regular rhythm.      Heart sounds: Normal heart sounds. No murmur heard.     No friction rub. No gallop.   Pulmonary:      Effort: Pulmonary effort is normal. No respiratory distress.      Breath sounds: Normal breath sounds. No wheezing or rales.   Chest:      Chest wall: No tenderness.   Abdominal:      General: Bowel sounds are normal. There is no distension.      Palpations: Abdomen is soft. There is no mass.      Tenderness: There is no abdominal tenderness. There is no rebound.   Musculoskeletal:         General: Normal range of motion.      Cervical back: Normal range of motion and neck supple.   Lymphadenopathy:      Cervical: No cervical adenopathy.   Skin:     General: Skin is warm and dry.      Comments: difibrillator scar left mid axilla and also mid sternal scar are both still " healing, slightly dry   Neurological:      General: No focal deficit present.      Mental Status: He is alert and oriented to person, place, and time.      Deep Tendon Reflexes: Reflexes normal.   Psychiatric:         Mood and Affect: Mood normal.         Behavior: Behavior normal.         Assessment/Plan   Diagnoses and all orders for this visit:  Healthcare maintenance  -     CBC; Future  -     Comprehensive Metabolic Panel; Future  -     Lipid Panel; Future  -     Vitamin D 25 hydroxy; Future  -     TSH with reflex to Free T4 if abnormal; Future  Vaccine refused by patient  Comments:  Shingrix  Tobacco abuse  -     CT lung screening low dose; Future  Moderate persistent asthma, unspecified whether complicated ([facility])  -     albuterol 90 mcg/actuation inhaler; Inhale 2 puffs every 4 hours if needed for wheezing or shortness of breath.  -     fluticasone-umeclidin-vilanter (Trelegy Ellipta) 200-62.5-25 mcg blister with device; Inhale 1 puff once daily.  Prostate cancer screening  -     Prostate Specific Antigen; Future

## 2024-07-25 ENCOUNTER — HOSPITAL ENCOUNTER (OUTPATIENT)
Dept: CARDIOLOGY | Facility: CLINIC | Age: 54
Discharge: HOME | End: 2024-07-25
Payer: COMMERCIAL

## 2024-07-25 DIAGNOSIS — I42.2 HYPERTROPHIC CARDIOMYOPATHY (MULTI): ICD-10-CM

## 2024-07-25 PROCEDURE — 93260 PRGRMG DEV EVAL IMPLTBL SYS: CPT

## 2024-08-15 ENCOUNTER — LAB (OUTPATIENT)
Dept: LAB | Facility: LAB | Age: 54
End: 2024-08-15
Payer: COMMERCIAL

## 2024-08-15 DIAGNOSIS — Z12.5 PROSTATE CANCER SCREENING: ICD-10-CM

## 2024-08-15 DIAGNOSIS — Z00.00 HEALTHCARE MAINTENANCE: ICD-10-CM

## 2024-08-15 LAB
25(OH)D3 SERPL-MCNC: 34 NG/ML (ref 30–100)
ALBUMIN SERPL BCP-MCNC: 4.2 G/DL (ref 3.4–5)
ALP SERPL-CCNC: 84 U/L (ref 33–120)
ALT SERPL W P-5'-P-CCNC: 32 U/L (ref 10–52)
ANION GAP SERPL CALC-SCNC: 11 MMOL/L (ref 10–20)
AST SERPL W P-5'-P-CCNC: 24 U/L (ref 9–39)
BILIRUB SERPL-MCNC: 0.6 MG/DL (ref 0–1.2)
BUN SERPL-MCNC: 14 MG/DL (ref 6–23)
CALCIUM SERPL-MCNC: 9.8 MG/DL (ref 8.6–10.6)
CHLORIDE SERPL-SCNC: 106 MMOL/L (ref 98–107)
CHOLEST SERPL-MCNC: 166 MG/DL (ref 0–199)
CHOLESTEROL/HDL RATIO: 3.3
CO2 SERPL-SCNC: 26 MMOL/L (ref 21–32)
CREAT SERPL-MCNC: 0.85 MG/DL (ref 0.5–1.3)
EGFRCR SERPLBLD CKD-EPI 2021: >90 ML/MIN/1.73M*2
ERYTHROCYTE [DISTWIDTH] IN BLOOD BY AUTOMATED COUNT: 12.9 % (ref 11.5–14.5)
GLUCOSE SERPL-MCNC: 102 MG/DL (ref 74–99)
HCT VFR BLD AUTO: 45.7 % (ref 41–52)
HDLC SERPL-MCNC: 50.4 MG/DL
HGB BLD-MCNC: 15.3 G/DL (ref 13.5–17.5)
LDLC SERPL CALC-MCNC: 99 MG/DL
MCH RBC QN AUTO: 31.6 PG (ref 26–34)
MCHC RBC AUTO-ENTMCNC: 33.5 G/DL (ref 32–36)
MCV RBC AUTO: 94 FL (ref 80–100)
NON HDL CHOLESTEROL: 116 MG/DL (ref 0–149)
NRBC BLD-RTO: 0 /100 WBCS (ref 0–0)
PLATELET # BLD AUTO: 103 X10*3/UL (ref 150–450)
POTASSIUM SERPL-SCNC: 4.4 MMOL/L (ref 3.5–5.3)
PROT SERPL-MCNC: 6.3 G/DL (ref 6.4–8.2)
PSA SERPL-MCNC: 0.6 NG/ML
RBC # BLD AUTO: 4.84 X10*6/UL (ref 4.5–5.9)
SODIUM SERPL-SCNC: 139 MMOL/L (ref 136–145)
TRIGL SERPL-MCNC: 82 MG/DL (ref 0–149)
TSH SERPL-ACNC: 1 MIU/L (ref 0.44–3.98)
VLDL: 16 MG/DL (ref 0–40)
WBC # BLD AUTO: 10.5 X10*3/UL (ref 4.4–11.3)

## 2024-08-15 PROCEDURE — 85027 COMPLETE CBC AUTOMATED: CPT

## 2024-08-15 PROCEDURE — 80053 COMPREHEN METABOLIC PANEL: CPT

## 2024-08-15 PROCEDURE — 84443 ASSAY THYROID STIM HORMONE: CPT

## 2024-08-15 PROCEDURE — 84153 ASSAY OF PSA TOTAL: CPT

## 2024-08-15 PROCEDURE — 82306 VITAMIN D 25 HYDROXY: CPT

## 2024-08-15 PROCEDURE — 36415 COLL VENOUS BLD VENIPUNCTURE: CPT

## 2024-08-15 PROCEDURE — 80061 LIPID PANEL: CPT

## 2024-08-16 DIAGNOSIS — D69.6 THROMBOCYTOPENIA (CMS-HCC): Primary | ICD-10-CM

## 2024-08-24 DIAGNOSIS — E78.5 HYPERLIPIDEMIA, UNSPECIFIED HYPERLIPIDEMIA TYPE: ICD-10-CM

## 2024-08-26 RX ORDER — ATORVASTATIN CALCIUM 40 MG/1
40 TABLET, FILM COATED ORAL NIGHTLY
Qty: 90 TABLET | Refills: 1 | Status: SHIPPED | OUTPATIENT
Start: 2024-08-26 | End: 2024-11-24

## 2024-08-28 ENCOUNTER — HOSPITAL ENCOUNTER (OUTPATIENT)
Dept: RADIOLOGY | Facility: CLINIC | Age: 54
Discharge: HOME | End: 2024-08-28
Payer: COMMERCIAL

## 2024-08-28 DIAGNOSIS — Z72.0 TOBACCO ABUSE: ICD-10-CM

## 2024-08-28 PROCEDURE — 71271 CT THORAX LUNG CANCER SCR C-: CPT

## 2024-08-30 DIAGNOSIS — R91.1 LUNG NODULE: Primary | ICD-10-CM

## 2024-10-28 ENCOUNTER — OFFICE VISIT (OUTPATIENT)
Dept: CARDIOLOGY | Facility: HOSPITAL | Age: 54
End: 2024-10-28
Payer: COMMERCIAL

## 2024-10-28 VITALS
OXYGEN SATURATION: 96 % | SYSTOLIC BLOOD PRESSURE: 127 MMHG | BODY MASS INDEX: 26.51 KG/M2 | DIASTOLIC BLOOD PRESSURE: 79 MMHG | WEIGHT: 200 LBS | HEIGHT: 73 IN | HEART RATE: 89 BPM

## 2024-10-28 DIAGNOSIS — I42.2 HYPERTROPHIC CARDIOMYOPATHY (MULTI): Primary | ICD-10-CM

## 2024-10-28 DIAGNOSIS — E78.5 HYPERLIPIDEMIA, UNSPECIFIED HYPERLIPIDEMIA TYPE: ICD-10-CM

## 2024-10-28 PROCEDURE — 3074F SYST BP LT 130 MM HG: CPT | Performed by: INTERNAL MEDICINE

## 2024-10-28 PROCEDURE — 99214 OFFICE O/P EST MOD 30 MIN: CPT | Performed by: INTERNAL MEDICINE

## 2024-10-28 PROCEDURE — 3078F DIAST BP <80 MM HG: CPT | Performed by: INTERNAL MEDICINE

## 2024-10-28 PROCEDURE — 4004F PT TOBACCO SCREEN RCVD TLK: CPT | Performed by: INTERNAL MEDICINE

## 2024-10-28 PROCEDURE — 93005 ELECTROCARDIOGRAM TRACING: CPT | Performed by: INTERNAL MEDICINE

## 2024-10-28 PROCEDURE — 3008F BODY MASS INDEX DOCD: CPT | Performed by: INTERNAL MEDICINE

## 2024-10-28 RX ORDER — HYDROCHLOROTHIAZIDE 25 MG/1
120 TABLET ORAL NIGHTLY
Qty: 90 TABLET | Refills: 3 | Status: SHIPPED | OUTPATIENT
Start: 2024-10-28 | End: 2025-10-28

## 2024-10-28 RX ORDER — ATORVASTATIN CALCIUM 40 MG/1
40 TABLET, FILM COATED ORAL NIGHTLY
Qty: 90 TABLET | Refills: 3 | Status: SHIPPED | OUTPATIENT
Start: 2024-10-28 | End: 2025-10-28

## 2024-10-28 RX ORDER — ASPIRIN 81 MG/1
81 TABLET ORAL DAILY
Qty: 90 TABLET | Refills: 3 | Status: SHIPPED | OUTPATIENT
Start: 2024-10-28 | End: 2025-10-28

## 2024-10-28 ASSESSMENT — ENCOUNTER SYMPTOMS
LOSS OF SENSATION IN FEET: 0
OCCASIONAL FEELINGS OF UNSTEADINESS: 0
DEPRESSION: 0

## 2024-10-29 ENCOUNTER — HOSPITAL ENCOUNTER (OUTPATIENT)
Dept: CARDIOLOGY | Facility: CLINIC | Age: 54
Discharge: HOME | End: 2024-10-29
Payer: COMMERCIAL

## 2024-10-29 DIAGNOSIS — I42.2 HYPERTROPHIC CARDIOMYOPATHY (MULTI): ICD-10-CM

## 2024-10-29 PROCEDURE — 93296 REM INTERROG EVL PM/IDS: CPT

## 2024-10-29 PROCEDURE — 93295 DEV INTERROG REMOTE 1/2/MLT: CPT | Performed by: INTERNAL MEDICINE

## 2025-02-04 ENCOUNTER — HOSPITAL ENCOUNTER (OUTPATIENT)
Dept: CARDIOLOGY | Facility: CLINIC | Age: 55
Discharge: HOME | End: 2025-02-04
Payer: COMMERCIAL

## 2025-02-04 DIAGNOSIS — I42.2 HYPERTROPHIC CARDIOMYOPATHY (MULTI): ICD-10-CM

## 2025-02-04 PROCEDURE — 93295 DEV INTERROG REMOTE 1/2/MLT: CPT | Performed by: INTERNAL MEDICINE

## 2025-02-04 PROCEDURE — 93296 REM INTERROG EVL PM/IDS: CPT

## 2025-03-03 ENCOUNTER — TELEPHONE (OUTPATIENT)
Facility: CLINIC | Age: 55
End: 2025-03-03
Payer: COMMERCIAL

## 2025-03-03 NOTE — TELEPHONE ENCOUNTER
----- Message from Madelyn Ruff sent at 3/3/2025  8:37 AM EST -----  Regarding: FW: Patient no show follow up CT of the chest  Please call patient and as him to reschedule  ----- Message -----  From: Shelly Villarreal RN  Sent: 3/3/2025   8:07 AM EST  To: Madelyn Ruff, DO  Subject: Patient no show follow up CT of the chest        This patient did not show up for their scheduled Lung Cancer Screening follow up CT on 2/28/25. They can reschedule through [facility] or by calling the scheduling line, [phone], the patient can also call [phone].    Thank you,  Shelly GRAHAM  Lung Cancer Screening Navigation Team   [phone]

## 2025-03-04 ENCOUNTER — HOSPITAL ENCOUNTER (OUTPATIENT)
Dept: CARDIOLOGY | Facility: CLINIC | Age: 55
Discharge: HOME | End: 2025-03-04
Payer: COMMERCIAL

## 2025-03-04 DIAGNOSIS — Z95.810 PRESENCE OF AUTOMATIC CARDIOVERTER/DEFIBRILLATOR (AICD): ICD-10-CM

## 2025-03-04 DIAGNOSIS — I42.2 HYPERTROPHIC CARDIOMYOPATHY (MULTI): ICD-10-CM

## 2025-04-01 NOTE — PROGRESS NOTES
[facility] [place]   Hypertrophic Cardiomyopathy Center    Primary Care Physician: Madelyn Ruff DO  Date of Visit: 2025  9:00 AM EDT     HPI / Summary:   Festus Negrete Jr. is a 54 y.o. male with non obstructive HCM s/p primary prevention SubQ ICD 2024 and an LAD aneurysm who presents for follow up of non obstructive HCM.     He had a SubQ ICD placed in 2024 by Dr Campos for primary prevention given his high degree of LGE. No chest pain or pressure. Has occasional shortness of breath when doing physical labor but thinks it is proportional to his exertion. Doesn't notice any relation to hydration or heat. He is still smoking 1.5 packs of cigarettes per day. No shocks from the ICD. Doesn't wear sunscreen or a hat outside, despite having had a skin cancer on his nose and lips.     HCM History  The patient was initially 2020 with LVH. He reports only being told that he has HCM in .   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 Mother - breast cancer  Children: 2 kids - Celeste (b ), Flavia (b ) - screened in  and negative  Siblings: Sister Apolonia - schizophrenia, Sister Aziza - healthy, Brother Steven - healthy  Grandmother  in her 60s  Fathers cousin  in his 20s   - wife Danielle   Father's aunts and uncles  suddenly    Sudden Cardiac Arrest Risk Factors:   No syncope  No wall thickness above 30 mm.   No apical aneurysm after discussion with our CMR expert (thin, but contractile)  Ambulatory monitoring benign as of   No EF less than 50%  LGE >15% per discussion with CMR expert  No family history of SCA in a family member with HCM  SCA  risk: with high LGE burden likely moderate-high. As a result had SubQ ICD placed.     ROS: Relevant review of symptoms of negative unless discussed above.     Problems:   Patient Active Problem List   Diagnosis    NSTEMI (non-ST elevated myocardial infarction) (Multi)    Coronary artery aneurysm    Tobacco abuse    Hyperlipidemia    Hypertrophic cardiomyopathy (Multi)    Benign essential hypertension    Cervical radiculopathy, acute    Chest pain    Contusion of rib on right side    Deviated nasal septum    Diverticulitis of colon with perforation    Moderate persistent asthma    Nasal dryness    Hypertrophy of inferior nasal turbinate    Periumbilical abdominal pain    RUBY (dyspnea on exertion)    Chronic stable angina    Moderate mitral regurgitation    Diverticulitis    Neoplasm of appendix    Serrated polyp of colon    Coronary artery calcification    Vitamin D deficiency    Skin cancer    Thrombocytopenia (CMS-HCC)    Lung nodule       Medical History:   Past Medical History:   Diagnosis Date    Chest pain, atypical 09/10/2023    Chronic stable angina 10/20/2023    Contusion of rib on right side 09/10/2023    Coronary artery aneurysm 05/31/2023    Dr. He    Deviated nasal septum 09/10/2023    Diverticulitis of colon with perforation 06/25/2023    Hyperlipidemia 05/31/2023    Hypertrophic cardiomyopathy (Multi) 09/10/2023    Hypertrophy of inferior nasal turbinate 09/10/2023    Moderate persistent asthma, uncomplicated (HHS-HCC) 02/25/2020    Moderate persistent asthma    NSTEMI (non-ST elevated myocardial infarction) (Multi) 05/31/2023    Tobacco abuse 05/31/2023       Surgical Hx:   Past Surgical History:   Procedure Laterality Date    APPENDECTOMY      CARDIAC CATHETERIZATION      CARDIAC DEFIBRILLATOR PLACEMENT      CARDIAC ELECTROPHYSIOLOGY PROCEDURE N/A 06/24/2024    Procedure: ICD SUBQ Implant;  Surgeon: Marcella Campos MD;  Location: [place] [address] Cardiac Cath Lab;  Service: Electrophysiology;   "Laterality: N/A;  General MAC  Anesthesiologist:  Dr. Buckner  Vendor:  [employer]  1st case!    FOOT SURGERY          Family Hx:   Family History   Problem Relation Name Age of Onset    Breast cancer Mother      Bone cancer Mother      Heart attack Father      Coronary artery disease Father      Other (healthy adult) Father      Schizophrenia Sister      Bipolar disorder Sister        Social Hx:  Fun: camper, ride harleys  Occupation/School: Self employed el contractor.   EtOH/Smoking/Drugs: 1.5 packs per day for 40 years, 5-6 beers at a time a few days per week.     Medications  Current Outpatient Medications   Medication Instructions    albuterol 90 mcg/actuation inhaler 2 puffs, inhalation, Every 4 hours PRN    aspirin 81 mg, oral, Daily    atorvastatin (LIPITOR) 40 mg, oral, Nightly    fluticasone (Flonase Allergy Relief) 50 mcg/actuation nasal spray 2 sprays, Daily    fluticasone-umeclidin-vilanter (Trelegy Ellipta) 200-62.5-25 mcg blister with device 1 puff, inhalation, Daily    ibuprofen/pseudoephedrine HCl (ADVIL COLD AND SINUS ORAL) 1 tablet, Daily PRN    nitroglycerin (NITROSTAT) 0.4 mg, sublingual, Every 5 min PRN, May repeat dose every 5 minutes for up to 3 doses total.    verapamil SR (CALAN-SR) 120 mg, oral, Nightly, Do not crush or chew.       Allergies  Tree nuts, Egg, and Morphine    Exam:   Vitals: /80 (BP Location: Right arm, Patient Position: Sitting)   Pulse 74   Ht 1.854 m (6' 1\")   Wt 89.6 kg (197 lb 8 oz)   SpO2 94%   BMI 26.06 kg/m²   Wt Readings from Last 5 Encounters:   04/03/25 89.6 kg (197 lb 8 oz)   10/28/24 90.7 kg (200 lb)   07/19/24 89.8 kg (198 lb)   06/24/24 87.5 kg (193 lb)   05/29/24 89.9 kg (198 lb 4.8 oz)     GEN: Pleasant, well-appearing, no acute distress.  HEENT: JVP not elevated  CHEST: Clear to auscultation, No wheeze, good air movement.  CV: normal rate, regular rhythm, no murmurs at rest. Subxyphoid and left lateral scars from SubQ ICD look " "well healed, palpable SubQ ICD along left lateral chest with no hematoma or bruising  NEURO: grossly non focal  SKIN: divot on nose from skin cancer removal     Labs:   Lipids  Lab Results   Component Value Date    CHOL 166 08/15/2024    CHOL 149 12/04/2023     Lab Results   Component Value Date    HDL 50.4 08/15/2024    HDL 61.7 12/04/2023     Lab Results   Component Value Date    LDLCALC 99 08/15/2024    LDLCALC 68 12/04/2023     Lab Results   Component Value Date    TRIG 82 08/15/2024    TRIG 96 12/04/2023     No components found for: \"CHOLHDL\"    Hemoglobin A1C  No results found for: \"HGBA1C\"    BMP  Lab Results   Component Value Date    GLUCOSE 102 (H) 08/15/2024    CALCIUM 9.8 08/15/2024     08/15/2024    K 4.4 08/15/2024    CO2 26 08/15/2024     08/15/2024    BUN 14 08/15/2024    CREATININE 0.85 08/15/2024         Notable Studies: imaging personally reviewed and summarized by me below  EKG:  -2/8/2024: NSR, resting HR 65 bpm, TWI in II, III, avF and V4-6  -10/28/2024: NSR, normal axis, , subtle TWI in V6    Echo:  -10/13/2023: IVS 2.6 cm, no LYNSEY or LVOT obstruction  -4/3/2025 personal review IVS 2.3-2.6 cm, mild MR, peak LVOT gradient 16 mmHg, chordal LYNSEY.     Ambulatory Rhythm Monitor:   -2/8/2024 - 2/24/2024: Maximum heart rate 179, minimum heart rate 54, average heart rate 74 beats minute, less than 1% PVC burden, 9 occurrences of NSVT with the fastest being 160 bpm and longest being 7 beats, less than 1% PAC burden, 9 occurrences of SVT with the fastest being 179 bpm and longest being 14 beats.    Cardiac MRI:  -11/2023: Maximal thickness 2.6 cm at the mid inferoseptal/anteroseptal walls.  [place] is slightly aneurysmal, no LYNSEY or LV outflow tract obstruction.  LVEF 57%. There is subepicardial mid wall late gadolinium enhancement at the level of the basal anteroseptal and anterior wall as well as the apical inferior wall. Estimated scar burden of 8%.  Mild mitral regurgitation. On discussion with " our HCM CMR [place] his scar burden is >15%.     Stress Test:  -2024: Stage III post protocol, 9 minutes, 10 METS, peak heart rate 129 bpm, 77% of age-predicted heart rate, stopped due to fatigue and dyspnea, ECG showed exaggeration of baseline T wave inversions.  Normal blood pressure response.  No LYNSEY or LVOT obstruction at rest or with exertion.    Catheterization:  -3/16/2020: normal LM, large LAD aneurysm 5-6 mm, angiographically normal other vessels.       Assessment and Plan  Festus Negrete Jr. is a 54 y.o. male with non obstructive HCM s/p primary prevention SubQ ICD 2024 and an LAD aneurysm who presents for follow up of non obstructive HCM.     #Symptoms: His maximal wall thickness is 2.6 cm.  He describes NYHA class II mild dyspnea with carrying heavy objects. This is likely from his ongoing cigarette smoking. He has no murmur at rest or with Valsalva and this correlates with no LVOT obstruction at rest, with Valsalva or after stress echocardiogram.  He felt better after switching the metoprolol to verapamil.  Continue verapamil. Stop smoking.     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 His father  suddenly, but it was at age 75 and he had been a smoker for many years.  It is unknown whether he had HCM.  Reportedly, Festus's father also had a cousin who  in his 20s and several aunts and uncles who  suddenly in their middle-age.  Both of his daughters were screened in  and were negative. Recommend repeat screening every 2-3 years in the near future and screening until mid 50s at least.     #SCA risk: Overall, his significant risk factor for sudden cardiac arrest is LGE seen on his cardiac MRI.  I reviewed the test with our cardiac MRI expert who felt as though the apex was thin but contractile, and therefore not necessarily a true aneurysm.  His estimated LGE burden is greater than 15% and with Holter showing limited NSVT with the fastest being  160 bpm and the longest being 7 beats he was thought to be an appropriate candidate for primary prevention ICD by the HCM board. He had a SubQ ICD placed in 5/2024. No shocks or issues since that time.     We also discussed smoking cessation for >3 minutes. Will refer for smoking cessation counseling. May consider a medication such as buspirone (he reports he is mostly anxious and uses smoking as an anxiety reliever) with his PCP. Also needs to wear sunscreen given history of skin cancer.     Will follow up in 6 months     Saúl Oropeza MD  Director, Sports Cardiology  [facility]    Part of this note was completed using dictation and voice recognition software. Please excuse minor errors and typos.

## 2025-04-03 ENCOUNTER — HOSPITAL ENCOUNTER (OUTPATIENT)
Dept: CARDIOLOGY | Facility: CLINIC | Age: 55
Discharge: HOME | End: 2025-04-03
Payer: COMMERCIAL

## 2025-04-03 ENCOUNTER — OFFICE VISIT (OUTPATIENT)
Dept: CARDIOLOGY | Facility: CLINIC | Age: 55
End: 2025-04-03
Payer: COMMERCIAL

## 2025-04-03 ENCOUNTER — APPOINTMENT (OUTPATIENT)
Dept: CARDIOLOGY | Facility: HOSPITAL | Age: 55
End: 2025-04-03
Payer: COMMERCIAL

## 2025-04-03 VITALS
WEIGHT: 197.5 LBS | BODY MASS INDEX: 26.17 KG/M2 | HEIGHT: 73 IN | OXYGEN SATURATION: 94 % | HEART RATE: 74 BPM | DIASTOLIC BLOOD PRESSURE: 80 MMHG | SYSTOLIC BLOOD PRESSURE: 125 MMHG

## 2025-04-03 DIAGNOSIS — I42.2 HYPERTROPHIC CARDIOMYOPATHY (MULTI): ICD-10-CM

## 2025-04-03 DIAGNOSIS — F17.200 SMOKING ADDICTION: Primary | ICD-10-CM

## 2025-04-03 LAB
AORTIC VALVE PEAK VELOCITY: 1.31 M/S
AV PEAK GRADIENT: 7 MMHG
AVA (PEAK VEL): 2.69 CM2
EJECTION FRACTION APICAL 4 CHAMBER: 63
EJECTION FRACTION: 66 %
LEFT ATRIUM VOLUME AREA LENGTH INDEX BSA: 58.6 ML/M2
LEFT VENTRICLE INTERNAL DIMENSION DIASTOLE: 4.82 CM (ref 3.5–6)
LEFT VENTRICULAR OUTFLOW TRACT DIAMETER: 1.92 CM
MITRAL VALVE E/A RATIO: 2.8
RIGHT VENTRICLE FREE WALL PEAK S': 11 CM/S
TRICUSPID ANNULAR PLANE SYSTOLIC EXCURSION: 2.5 CM

## 2025-04-03 PROCEDURE — 99406 BEHAV CHNG SMOKING 3-10 MIN: CPT | Performed by: INTERNAL MEDICINE

## 2025-04-03 PROCEDURE — 99214 OFFICE O/P EST MOD 30 MIN: CPT | Performed by: INTERNAL MEDICINE

## 2025-04-03 PROCEDURE — 3074F SYST BP LT 130 MM HG: CPT | Performed by: INTERNAL MEDICINE

## 2025-04-03 PROCEDURE — 3079F DIAST BP 80-89 MM HG: CPT | Performed by: INTERNAL MEDICINE

## 2025-04-03 PROCEDURE — 93306 TTE W/DOPPLER COMPLETE: CPT

## 2025-04-03 PROCEDURE — 3008F BODY MASS INDEX DOCD: CPT | Performed by: INTERNAL MEDICINE

## 2025-04-03 PROCEDURE — 93306 TTE W/DOPPLER COMPLETE: CPT | Performed by: STUDENT IN AN ORGANIZED HEALTH CARE EDUCATION/TRAINING PROGRAM

## 2025-04-03 ASSESSMENT — ENCOUNTER SYMPTOMS
DEPRESSION: 0
LOSS OF SENSATION IN FEET: 0
OCCASIONAL FEELINGS OF UNSTEADINESS: 0

## 2025-04-03 ASSESSMENT — PATIENT HEALTH QUESTIONNAIRE - PHQ9
SUM OF ALL RESPONSES TO PHQ9 QUESTIONS 1 AND 2: 0
1. LITTLE INTEREST OR PLEASURE IN DOING THINGS: NOT AT ALL
2. FEELING DOWN, DEPRESSED OR HOPELESS: NOT AT ALL

## 2025-04-03 ASSESSMENT — COLUMBIA-SUICIDE SEVERITY RATING SCALE - C-SSRS
6. HAVE YOU EVER DONE ANYTHING, STARTED TO DO ANYTHING, OR PREPARED TO DO ANYTHING TO END YOUR LIFE?: NO
2. HAVE YOU ACTUALLY HAD ANY THOUGHTS OF KILLING YOURSELF?: NO
1. IN THE PAST MONTH, HAVE YOU WISHED YOU WERE DEAD OR WISHED YOU COULD GO TO SLEEP AND NOT WAKE UP?: NO

## 2025-04-03 ASSESSMENT — PAIN SCALES - GENERAL: PAINLEVEL_OUTOF10: 0-NO PAIN

## 2025-04-03 NOTE — PATIENT INSTRUCTIONS
We will refer you for tobacco stopping assessment  Drink less Pepsi and Crystal light and more water.   Wear sunscreen to lower your risk of recurrent skin cancer.

## 2025-04-03 NOTE — Clinical Note
Doing well from HCM perspective. Still smoking 1.5 ppd. Referred for smoking cessation counseling but may be worth discussing buspirone with him or other anxiety relieving medication (his main trigger). We also discussed sunscreen use given history of skin cancer and continued outdoor work.

## 2025-04-08 ENCOUNTER — APPOINTMENT (OUTPATIENT)
Dept: RADIOLOGY | Facility: CLINIC | Age: 55
End: 2025-04-08
Payer: COMMERCIAL

## 2025-04-17 ENCOUNTER — CLINICAL SUPPORT (OUTPATIENT)
Dept: CARDIAC REHAB | Facility: CLINIC | Age: 55
End: 2025-04-17
Payer: COMMERCIAL

## 2025-04-17 DIAGNOSIS — F17.210 CIGARETTE NICOTINE DEPENDENCE WITHOUT COMPLICATION: Primary | ICD-10-CM

## 2025-04-17 DIAGNOSIS — F17.200 SMOKING ADDICTION: ICD-10-CM

## 2025-04-17 PROCEDURE — 99407 BEHAV CHNG SMOKING > 10 MIN: CPT | Performed by: INTERNAL MEDICINE

## 2025-04-17 RX ORDER — ASPIRIN/CALCIUM CARB/MAGNESIUM 325 MG
4 TABLET ORAL EVERY 2 HOUR PRN
Qty: 100 LOZENGE | Refills: 0 | Status: SHIPPED | OUTPATIENT
Start: 2025-04-17 | End: 2025-05-17

## 2025-04-17 RX ORDER — IBUPROFEN 200 MG
1 TABLET ORAL EVERY 24 HOURS
Qty: 30 PATCH | Refills: 0 | Status: SHIPPED | OUTPATIENT
Start: 2025-04-17 | End: 2025-05-17

## 2025-04-17 NOTE — PROGRESS NOTES
Tobacco Treatment Counseling Initial Visit    Name: Festus Negrete Jr.            MRN: [id number]          YOB: 1970           Age: 54 y.o.                  Today’s Date: 4/17/2025  Primary Care Physician: Madelyn Ruff DO  Referring Physician: Saúl Oropeza MD  Program Location: [address]         Start time: ***   End time: ***      Festus Negrete Jr. presents for initial session for Tobacco Treatment Counseling. Patient currently smoking *** {CPD/PPD:24291} and has been smoking {Smoking Duration:94179}. Patient has a {low/moderate/high:66160} dependence on nicotine according to the [facility] nicotine dependence assessment. At this time, patient is {motivated/unmotivated:65064} to quit smoking due to ***. See below for detailed assessment of tobacco use and treatment plan.    CO level (@ time of appt.): ***    Smoking triggers/routines:  ***    Past quit attempts:  ***    Potential barriers to quitting:  ***    Strengths:  ***    Medication plan:  ***    Coping strategies:  ***    Next steps:  ***  -TQD (date or TBD)    Follow-up scheduled on (date/time) at (location) to evaluate progress and make any necessary changes to quit plan. Patient to call office at [phone] with any questions/concerns.      Lianna Chen, RRT    TBD    Follow-up scheduled in 3 weeks to evaluate progress and make any necessary changes to quit plan. Patient to call office at [phone] with any questions/concerns.    Lianna Chen, RRT

## 2025-05-06 ENCOUNTER — HOSPITAL ENCOUNTER (OUTPATIENT)
Dept: CARDIOLOGY | Facility: CLINIC | Age: 55
Discharge: HOME | End: 2025-05-06
Payer: COMMERCIAL

## 2025-05-06 DIAGNOSIS — Z95.810 PRESENCE OF AUTOMATIC (IMPLANTABLE) CARDIAC DEFIBRILLATOR: ICD-10-CM

## 2025-05-06 DIAGNOSIS — I42.9 CARDIOMYOPATHY, UNSPECIFIED: ICD-10-CM

## 2025-05-06 PROCEDURE — 93296 REM INTERROG EVL PM/IDS: CPT

## 2025-05-06 PROCEDURE — 93295 DEV INTERROG REMOTE 1/2/MLT: CPT | Performed by: INTERNAL MEDICINE

## 2025-05-08 ENCOUNTER — APPOINTMENT (OUTPATIENT)
Dept: CARDIAC REHAB | Facility: CLINIC | Age: 55
End: 2025-05-08
Payer: COMMERCIAL

## 2025-05-11 DIAGNOSIS — F17.210 CIGARETTE NICOTINE DEPENDENCE WITHOUT COMPLICATION: ICD-10-CM

## 2025-05-11 RX ORDER — IBUPROFEN 200 MG
1 TABLET ORAL EVERY 24 HOURS
Qty: 28 PATCH | OUTPATIENT
Start: 2025-05-11 | End: 2025-06-10

## 2025-05-15 ENCOUNTER — CLINICAL SUPPORT (OUTPATIENT)
Dept: CARDIAC REHAB | Facility: CLINIC | Age: 55
End: 2025-05-15
Payer: COMMERCIAL

## 2025-05-15 DIAGNOSIS — F17.210 CIGARETTE NICOTINE DEPENDENCE WITHOUT COMPLICATION: Primary | ICD-10-CM

## 2025-05-15 PROCEDURE — 99407 BEHAV CHNG SMOKING > 10 MIN: CPT | Performed by: INTERNAL MEDICINE

## 2025-05-15 NOTE — PROGRESS NOTES
Tobacco Treatment Counseling Follow Up Visit    Name: Festus Negrete Jr.      MRN: [id number]       YOB: 1970      Age: 54 y.o.             Today's Date: 5/15/2025  Primary Care Physician: Madelyn Ruff DO    Start Time: 8:30 AM  End Time: 8:45 AM  Patient presents for follow-up session for tobacco treatment counseling. Since his first session, he continues to smoke the same, 2 PPD. He states that he currently has too much going on in his life and has had a difficult time focusing on quitting. He received both the 21mg patches and 4mg lozenges. He used the patches for a few days with no side effects noted. He states that the patches helped him cut back to a little over 1 PPD when he used them. He hasn't used any of the lozenges. He states that he is scheduled for aculaser therapy on June 3rd. Discussed using a substitute/replacement as well as distraction techniques to help cope during triggering situations and he states that he hasn't used anything yet. He doesn't wish to check his CO level today with our Smokerlyzer device. He has not set an exact target quit date, but would like to quit after aculaser treatment on June 3rd. The plan at this time is to get aculaser therapy done. Discussed trying NRT if aculaser is unsuccessful. Will follow up next month.    Lianna Chen, RRT

## 2025-06-12 ENCOUNTER — APPOINTMENT (OUTPATIENT)
Dept: CARDIAC REHAB | Facility: CLINIC | Age: 55
End: 2025-06-12
Payer: COMMERCIAL

## 2025-06-19 ENCOUNTER — APPOINTMENT (OUTPATIENT)
Dept: CARDIAC REHAB | Facility: CLINIC | Age: 55
End: 2025-06-19
Payer: COMMERCIAL

## 2025-06-23 ENCOUNTER — APPOINTMENT (OUTPATIENT)
Dept: CARDIOLOGY | Facility: CLINIC | Age: 55
End: 2025-06-23
Payer: COMMERCIAL

## 2025-06-26 ENCOUNTER — APPOINTMENT (OUTPATIENT)
Dept: CARDIAC REHAB | Facility: CLINIC | Age: 55
End: 2025-06-26
Payer: COMMERCIAL

## 2025-07-03 ENCOUNTER — CLINICAL SUPPORT (OUTPATIENT)
Dept: CARDIAC REHAB | Facility: CLINIC | Age: 55
End: 2025-07-03
Payer: COMMERCIAL

## 2025-07-03 DIAGNOSIS — F17.210 CIGARETTE NICOTINE DEPENDENCE WITHOUT COMPLICATION: Primary | ICD-10-CM

## 2025-07-03 PROCEDURE — 99407 BEHAV CHNG SMOKING > 10 MIN: CPT | Performed by: INTERNAL MEDICINE

## 2025-07-03 NOTE — PROGRESS NOTES
Tobacco Treatment Counseling Follow Up Visit    Name: Festus Negrete Jr.     MRN: [id number]      YOB: 1970    Age: 55 y.o.               Today's Date: 7/3/2025  Primary Care Physician: Madelyn Ruff DO    Start Time: 8:32 AM  End Time: 8:51 AM  Patient presents for follow-up session for tobacco treatment counseling. Since his last session, he has cut down to 1 ½ PPD. He had aculaser treatment done last month and states that he was tobacco free for 5 days. He relapsed due to him drinking alcohol. He had a second treatment done since and states that he smoked the next day. He feels that right now is not a good time to quit due to him focusing on other things. He would like to try aculaser again in the fall. He states that right now, his goal is to get down to 1 PPD. He has 21mg patches and 4mg lozenges at home and states that he would like to start using the NRT again. He continues to only smoke outside or in the garage and is trying not to smoke in his new truck. Checked his CO level with our Smokerlyzer device and his level has increased from 43 ppm at his first session to 52 ppm today (normal/non-smoker=0-6 ppm). An exact target quit date remains unestablished at this time. The plan is to start the 21mg patches and 4mg lozenges and cut back. Will follow up in September.     Lianna Chen, RRT

## 2025-07-22 DIAGNOSIS — J45.40 MODERATE PERSISTENT ASTHMA, UNSPECIFIED WHETHER COMPLICATED (HHS-HCC): ICD-10-CM

## 2025-07-22 RX ORDER — FLUTICASONE FUROATE, UMECLIDINIUM BROMIDE AND VILANTEROL TRIFENATATE 200; 62.5; 25 UG/1; UG/1; UG/1
1 POWDER RESPIRATORY (INHALATION) DAILY
Qty: 60 EACH | Refills: 0 | Status: SHIPPED | OUTPATIENT
Start: 2025-07-22

## 2025-07-29 ENCOUNTER — APPOINTMENT (OUTPATIENT)
Facility: CLINIC | Age: 55
End: 2025-07-29
Payer: COMMERCIAL

## 2025-07-29 VITALS
WEIGHT: 197 LBS | SYSTOLIC BLOOD PRESSURE: 132 MMHG | RESPIRATION RATE: 16 BRPM | OXYGEN SATURATION: 97 % | BODY MASS INDEX: 26.11 KG/M2 | HEART RATE: 64 BPM | TEMPERATURE: 97.8 F | HEIGHT: 73 IN | DIASTOLIC BLOOD PRESSURE: 86 MMHG

## 2025-07-29 DIAGNOSIS — J45.40 MODERATE PERSISTENT ASTHMA, UNSPECIFIED WHETHER COMPLICATED (HHS-HCC): ICD-10-CM

## 2025-07-29 DIAGNOSIS — I47.19 OTHER SUPRAVENTRICULAR TACHYCARDIA: ICD-10-CM

## 2025-07-29 DIAGNOSIS — F41.9 ANXIETY: ICD-10-CM

## 2025-07-29 DIAGNOSIS — G56.03 BILATERAL CARPAL TUNNEL SYNDROME: ICD-10-CM

## 2025-07-29 DIAGNOSIS — F17.200 SMOKER: ICD-10-CM

## 2025-07-29 DIAGNOSIS — Z28.20 VACCINE REFUSED BY PATIENT: ICD-10-CM

## 2025-07-29 DIAGNOSIS — Z00.00 HEALTHCARE MAINTENANCE: Primary | ICD-10-CM

## 2025-07-29 DIAGNOSIS — Z12.5 PROSTATE CANCER SCREENING: ICD-10-CM

## 2025-07-29 PROCEDURE — 99396 PREV VISIT EST AGE 40-64: CPT | Performed by: FAMILY MEDICINE

## 2025-07-29 PROCEDURE — 3079F DIAST BP 80-89 MM HG: CPT | Performed by: FAMILY MEDICINE

## 2025-07-29 PROCEDURE — 3075F SYST BP GE 130 - 139MM HG: CPT | Performed by: FAMILY MEDICINE

## 2025-07-29 PROCEDURE — 99214 OFFICE O/P EST MOD 30 MIN: CPT | Performed by: FAMILY MEDICINE

## 2025-07-29 PROCEDURE — 3008F BODY MASS INDEX DOCD: CPT | Performed by: FAMILY MEDICINE

## 2025-07-29 RX ORDER — FLUTICASONE FUROATE, UMECLIDINIUM BROMIDE AND VILANTEROL TRIFENATATE 200; 62.5; 25 UG/1; UG/1; UG/1
1 POWDER RESPIRATORY (INHALATION) DAILY
Qty: 180 EACH | Refills: 1 | Status: SHIPPED | OUTPATIENT
Start: 2025-07-29 | End: 2026-07-29

## 2025-07-29 RX ORDER — SERTRALINE HYDROCHLORIDE 25 MG/1
25 TABLET, FILM COATED ORAL DAILY
Qty: 28 TABLET | Refills: 0 | Status: SHIPPED | OUTPATIENT
Start: 2025-07-29 | End: 2025-08-12

## 2025-07-29 RX ORDER — SERTRALINE HYDROCHLORIDE 50 MG/1
50 TABLET, FILM COATED ORAL DAILY
Qty: 30 TABLET | Refills: 1 | Status: SHIPPED | OUTPATIENT
Start: 2025-07-29 | End: 2025-09-27

## 2025-07-29 RX ORDER — ALBUTEROL SULFATE 90 UG/1
2 INHALANT RESPIRATORY (INHALATION) EVERY 4 HOURS PRN
Qty: 18 G | Refills: 3 | Status: SHIPPED | OUTPATIENT
Start: 2025-07-29

## 2025-07-29 NOTE — PROGRESS NOTES
.Subjective   Patient ID: Festus Negrete Jr. is a 55 y.o. male who presents for Annual Exam.    Dentist and Eye Doctor appointments: UTD and wears contacts  Immunizations: UTD but due for prevnar 20   Diet: pepsi, occasionally cucumber and corn and green beans, Eats a lot of red meat and eats chicken   Exercise:  physical job   Supplements: mvi  Sexually Active: Yes  Cancer Screening:    Prostate: due for psa   Colon: 2023   Testicular:  home self exams   Skin:  sees derm regularly      Notices numbness right hand in the median nerve area when he is driving. 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     Has a lot of anxiety and feels like he can't sit still ever.  He had been on wellbutrin before but he didn't like it. He tries laser as well to quit smoking.  He has trouble sleeping and is up really early.  He is very anxious and can't seem to be still.        Review of Systems    Current Outpatient Medications   Medication Instructions    albuterol 90 mcg/actuation inhaler 2 puffs, inhalation, Every 4 hours PRN    aspirin 81 mg, oral, Daily    atorvastatin (LIPITOR) 40 mg, oral, Nightly    fluticasone (Flonase Allergy Relief) 50 mcg/actuation nasal spray 2 sprays, Daily    fluticasone-umeclidin-vilanter (Trelegy Ellipta) 200-62.5-25 mcg blister with device 1 puff, inhalation, Daily    ibuprofen/pseudoephedrine HCl (ADVIL COLD AND SINUS ORAL) 1 tablet, Daily PRN    nitroglycerin (NITROSTAT) 0.4 mg, sublingual, Every 5 min PRN, May repeat dose every 5 minutes for up to 3 doses total.    sertraline (ZOLOFT) 25 mg, oral, Daily    sertraline (ZOLOFT) 50 mg, oral, Daily, Begin week #3    verapamil SR (CALAN-SR) 120 mg, oral, Nightly, Do not crush or chew.       RX Allergies[1]  Tree nuts, Egg, and Morphine    Past Medical History:  09/10/2023: Chest pain, atypical  10/20/2023: Chronic stable angina  09/10/2023: Contusion of rib on right side  05/31/2023: Coronary  "artery aneurysm      Comment:  Dr. He  09/10/2023: Deviated nasal septum  06/25/2023: Diverticulitis of colon with perforation  05/31/2023: Hyperlipidemia  09/10/2023: Hypertrophic cardiomyopathy (Multi)  09/10/2023: Hypertrophy of inferior nasal turbinate  02/25/2020: Moderate persistent asthma, uncomplicated ([facility])      Comment:  Moderate persistent asthma  05/31/2023: NSTEMI (non-ST elevated myocardial infarction) (Multi)  07/19/2024: Skin cancer      Comment:  Lip/nose; sees dermatology every 6 months    05/31/2023: Tobacco abuse    Social History     Tobacco Use    Smoking status: Every Day     Current packs/day: 2.00     Average packs/day: 2.0 packs/day for 20.0 years (40.0 ttl pk-yrs)     Types: Cigarettes    Smokeless tobacco: Never    Tobacco comments:     2 ppd x 25 years   Substance Use Topics    Alcohol use: Yes     Comment: More in the last 2 weeks but drinks normally about 6 to 10 beers 4 times a week       Objective     Vitals:    07/29/25 0816   BP: 132/86   Pulse: 64   Resp: 16   Temp: 36.6 °C (97.8 °F)   SpO2: 97%   Weight: 89.4 kg (197 lb)   Height: 1.854 m (6' 1\")     No LMP for male patient.    Physical Exam  Constitutional:       General: He is not in acute distress.     Appearance: He is well-developed. He is not diaphoretic.   HENT:      Head: Normocephalic and atraumatic.      Right Ear: Tympanic membrane normal.      Left Ear: Tympanic membrane normal.      Nose: Nose normal.      Mouth/Throat:      Mouth: Mucous membranes are moist.     Eyes:      General: No scleral icterus.     Pupils: Pupils are equal, round, and reactive to light.     Neck:      Thyroid: No thyromegaly.      Vascular: No JVD.     Cardiovascular:      Rate and Rhythm: Normal rate and regular rhythm.      Heart sounds: Normal heart sounds. No murmur heard.     No friction rub. No gallop.   Pulmonary:      Effort: Pulmonary effort is normal. No respiratory distress.      Breath sounds: Normal breath sounds. No " "wheezing or rales.   Chest:      Chest wall: No tenderness.   Abdominal:      General: Bowel sounds are normal. There is no distension.      Palpations: Abdomen is soft. There is no mass.      Tenderness: There is no abdominal tenderness. There is no rebound.     Musculoskeletal:         General: Normal range of motion.      Cervical back: Normal range of motion and neck supple.   Lymphadenopathy:      Cervical: No cervical adenopathy.     Skin:     General: Skin is warm and dry.     Neurological:      General: No focal deficit present.      Mental Status: He is alert and oriented to person, place, and time.      Deep Tendon Reflexes: Reflexes normal.     Psychiatric:         Mood and Affect: Mood normal.         Behavior: Behavior normal.         Assessment/Plan   Diagnoses and all orders for this visit:  Healthcare maintenance  -     CBC; Future  -     Comprehensive Metabolic Panel; Future  -     Lipid Panel; Future  -     TSH with reflex to Free T4 if abnormal; Future  -     Vitamin D 25 hydroxy; Future  Smoker  -     CT lung screening low dose; Future  Prostate cancer screening  -     Prostate Specific Antigen; Future  Bilateral carpal tunnel syndrome  -     EMG & nerve conduction; Future  Moderate persistent asthma, unspecified whether complicated ([facility])  -     fluticasone-umeclidin-vilanter (Trelegy Ellipta) 200-62.5-25 mcg blister with device; Inhale 1 puff once daily.  -     albuterol 90 mcg/actuation inhaler; Inhale 2 puffs every 4 hours if needed for wheezing or shortness of breath.  Anxiety  -     sertraline (Zoloft) 25 mg tablet; Take 1 tablet (25 mg) by mouth once daily for 14 days.  -     sertraline (Zoloft) 50 mg tablet; Take 1 tablet (50 mg) by mouth once daily. Begin week #3                 [1]   Allergies  Allergen Reactions    Tree Nuts Shortness of breath    Egg Unknown    Morphine Hives and Other     Arm was \"burning\"     "

## 2025-07-29 NOTE — PATIENT INSTRUCTIONS
Today we performed your Annual Physical Exam.  Your preventative health care, labs and vaccinations have been reviewed and are up to date.      You have refused  vaccines for Shingrix and Prevnar 20 and flu.  If you choose to vaccinate against these potentially deadly illnesses at any time you are welcome to change your mind and come back to obtain your vaccinations.      Today we have addressed your anxiety.  We have discussed starting an SSRI in addition to counseling/CBT.  You deferred counseling at this time. I have ordered labs to rule out any organic causes.  I have advised that you RTC in 6 weeks for a recheck.       We also addressed your carpal tunnel symptoms and ordered EMG/NCS.      I  strongly encourage you to quit smoking/vaping of any product  Follow up when results received.    Follow up in 1 year for your Complete Physical Exam

## 2025-07-30 LAB
25(OH)D3+25(OH)D2 SERPL-MCNC: 44 NG/ML (ref 30–100)
ALBUMIN SERPL-MCNC: 4.1 G/DL (ref 3.6–5.1)
ALP SERPL-CCNC: 81 U/L (ref 35–144)
ALT SERPL-CCNC: 30 U/L (ref 9–46)
ANION GAP SERPL CALCULATED.4IONS-SCNC: 8 MMOL/L (CALC) (ref 7–17)
AST SERPL-CCNC: 25 U/L (ref 10–35)
BILIRUB SERPL-MCNC: 0.5 MG/DL (ref 0.2–1.2)
BUN SERPL-MCNC: 14 MG/DL (ref 7–25)
CALCIUM SERPL-MCNC: 8.9 MG/DL (ref 8.6–10.3)
CHLORIDE SERPL-SCNC: 109 MMOL/L (ref 98–110)
CHOLEST SERPL-MCNC: 139 MG/DL
CHOLEST/HDLC SERPL: 2.2 (CALC)
CO2 SERPL-SCNC: 25 MMOL/L (ref 20–32)
CREAT SERPL-MCNC: 0.77 MG/DL (ref 0.7–1.3)
EGFRCR SERPLBLD CKD-EPI 2021: 106 ML/MIN/1.73M2
ERYTHROCYTE [DISTWIDTH] IN BLOOD BY AUTOMATED COUNT: 13.6 % (ref 11–15)
GLUCOSE SERPL-MCNC: 89 MG/DL (ref 65–99)
HCT VFR BLD AUTO: 46.7 % (ref 38.5–50)
HDLC SERPL-MCNC: 63 MG/DL
HGB BLD-MCNC: 14.9 G/DL (ref 13.2–17.1)
LDLC SERPL CALC-MCNC: 60 MG/DL (CALC)
MCH RBC QN AUTO: 32.6 PG (ref 27–33)
MCHC RBC AUTO-ENTMCNC: 31.9 G/DL (ref 32–36)
MCV RBC AUTO: 102.2 FL (ref 80–100)
NONHDLC SERPL-MCNC: 76 MG/DL (CALC)
PLATELET # BLD AUTO: 122 THOUSAND/UL (ref 140–400)
PMV BLD REES-ECKER: 11.4 FL (ref 7.5–12.5)
POTASSIUM SERPL-SCNC: 4 MMOL/L (ref 3.5–5.3)
PROT SERPL-MCNC: 6 G/DL (ref 6.1–8.1)
PSA SERPL-MCNC: 0.85 NG/ML
RBC # BLD AUTO: 4.57 MILLION/UL (ref 4.2–5.8)
SODIUM SERPL-SCNC: 142 MMOL/L (ref 135–146)
TRIGL SERPL-MCNC: 82 MG/DL
TSH SERPL-ACNC: 0.79 MIU/L (ref 0.4–4.5)
WBC # BLD AUTO: 11.6 THOUSAND/UL (ref 3.8–10.8)

## 2025-08-05 ENCOUNTER — HOSPITAL ENCOUNTER (OUTPATIENT)
Dept: CARDIOLOGY | Facility: CLINIC | Age: 55
Discharge: HOME | End: 2025-08-05
Payer: COMMERCIAL

## 2025-08-05 DIAGNOSIS — Z95.810 PRESENCE OF AUTOMATIC (IMPLANTABLE) CARDIAC DEFIBRILLATOR: ICD-10-CM

## 2025-08-05 DIAGNOSIS — I42.9 CARDIOMYOPATHY, UNSPECIFIED: ICD-10-CM

## 2025-08-05 PROCEDURE — 93296 REM INTERROG EVL PM/IDS: CPT

## 2025-08-06 ENCOUNTER — TELEPHONE (OUTPATIENT)
Dept: SCHEDULING | Age: 55
End: 2025-08-06

## 2025-08-18 DIAGNOSIS — I42.9 CARDIOMYOPATHY, UNSPECIFIED TYPE (MULTI): ICD-10-CM

## 2025-08-18 DIAGNOSIS — Z95.810 CARDIAC DEFIBRILLATOR IN PLACE: Primary | ICD-10-CM

## 2025-08-20 DIAGNOSIS — F41.9 ANXIETY: ICD-10-CM

## 2025-08-21 ENCOUNTER — PATIENT MESSAGE (OUTPATIENT)
Dept: CARDIOLOGY | Facility: CLINIC | Age: 55
End: 2025-08-21
Payer: COMMERCIAL

## 2025-08-21 DIAGNOSIS — I42.2 HYPERTROPHIC CARDIOMYOPATHY (MULTI): Primary | ICD-10-CM

## 2025-08-22 ENCOUNTER — HOSPITAL ENCOUNTER (OUTPATIENT)
Dept: CARDIOLOGY | Facility: CLINIC | Age: 55
Discharge: HOME | End: 2025-08-22
Payer: COMMERCIAL

## 2025-08-22 DIAGNOSIS — Z95.810 PRESENCE OF AUTOMATIC (IMPLANTABLE) CARDIAC DEFIBRILLATOR: ICD-10-CM

## 2025-08-22 DIAGNOSIS — I42.9 CARDIOMYOPATHY, UNSPECIFIED: ICD-10-CM

## 2025-08-22 PROCEDURE — 93261 INTERROGATE SUBQ DEFIB: CPT | Performed by: NURSE PRACTITIONER

## 2025-08-22 PROCEDURE — 93261 INTERROGATE SUBQ DEFIB: CPT

## 2025-08-22 RX ORDER — SERTRALINE HYDROCHLORIDE 50 MG/1
50 TABLET, FILM COATED ORAL DAILY
Qty: 30 TABLET | Refills: 0 | Status: SHIPPED | OUTPATIENT
Start: 2025-08-22 | End: 2025-10-21

## 2025-08-24 ENCOUNTER — TELEPHONE (OUTPATIENT)
Facility: CLINIC | Age: 55
End: 2025-08-24
Payer: COMMERCIAL

## 2025-08-24 DIAGNOSIS — R79.89 ABNORMAL CBC: Primary | ICD-10-CM

## 2025-08-25 LAB — BNP SERPL-MCNC: 437 PG/ML

## 2025-09-02 ENCOUNTER — HOSPITAL ENCOUNTER (OUTPATIENT)
Dept: RADIOLOGY | Facility: CLINIC | Age: 55
Discharge: HOME | End: 2025-09-02
Payer: COMMERCIAL

## 2025-09-02 DIAGNOSIS — F17.200 SMOKER: ICD-10-CM

## 2025-09-02 PROCEDURE — 71271 CT THORAX LUNG CANCER SCR C-: CPT

## 2025-09-02 PROCEDURE — 71271 CT THORAX LUNG CANCER SCR C-: CPT | Performed by: RADIOLOGY

## 2025-09-10 ENCOUNTER — APPOINTMENT (OUTPATIENT)
Facility: CLINIC | Age: 55
End: 2025-09-10
Payer: COMMERCIAL

## 2025-10-22 ENCOUNTER — APPOINTMENT (OUTPATIENT)
Facility: CLINIC | Age: 55
End: 2025-10-22
Payer: COMMERCIAL

## (undated) DEVICE — ENVELOPE, ANTIBACTERIAL, AIGIS RX TYRX, ABSORBABLE, LRG

## (undated) DEVICE — PHOTONBLADE, WITH ADAPTIVE SMOKE EVAC

## (undated) DEVICE — ELECTRODE, MULTIFUNCTION, QUICK-COMBO, EDGE SYSTEM, 2 FT